# Patient Record
Sex: MALE | URBAN - METROPOLITAN AREA
[De-identification: names, ages, dates, MRNs, and addresses within clinical notes are randomized per-mention and may not be internally consistent; named-entity substitution may affect disease eponyms.]

---

## 2022-01-01 ENCOUNTER — INPATIENT (INPATIENT)
Facility: HOSPITAL | Age: 52
LOS: 0 days | DRG: 23 | End: 2022-08-13
Attending: NEUROLOGICAL SURGERY | Admitting: NEUROLOGICAL SURGERY
Payer: MEDICAID

## 2022-01-01 ENCOUNTER — EMERGENCY (EMERGENCY)
Facility: HOSPITAL | Age: 52
LOS: 0 days | Discharge: ANOTHER HOSPITAL | End: 2022-08-12
Attending: EMERGENCY MEDICINE

## 2022-01-01 ENCOUNTER — APPOINTMENT (OUTPATIENT)
Dept: NEUROSURGERY | Facility: HOSPITAL | Age: 52
End: 2022-01-01

## 2022-01-01 VITALS
WEIGHT: 220.02 LBS | OXYGEN SATURATION: 74 % | HEIGHT: 71 IN | RESPIRATION RATE: 16 BRPM | DIASTOLIC BLOOD PRESSURE: 87 MMHG | HEART RATE: 92 BPM | SYSTOLIC BLOOD PRESSURE: 159 MMHG | TEMPERATURE: 102 F

## 2022-01-01 VITALS
HEART RATE: 89 BPM | OXYGEN SATURATION: 94 % | SYSTOLIC BLOOD PRESSURE: 163 MMHG | RESPIRATION RATE: 24 BRPM | DIASTOLIC BLOOD PRESSURE: 74 MMHG | TEMPERATURE: 98 F

## 2022-01-01 VITALS — HEART RATE: 78 BPM | OXYGEN SATURATION: 90 %

## 2022-01-01 DIAGNOSIS — A41.9 SEPSIS, UNSPECIFIED ORGANISM: ICD-10-CM

## 2022-01-01 DIAGNOSIS — I63.9 CEREBRAL INFARCTION, UNSPECIFIED: ICD-10-CM

## 2022-01-01 DIAGNOSIS — R46.4 SLOWNESS AND POOR RESPONSIVENESS: ICD-10-CM

## 2022-01-01 DIAGNOSIS — R41.82 ALTERED MENTAL STATUS, UNSPECIFIED: ICD-10-CM

## 2022-01-01 DIAGNOSIS — K40.90 UNILATERAL INGUINAL HERNIA, WITHOUT OBSTRUCTION OR GANGRENE, NOT SPECIFIED AS RECURRENT: ICD-10-CM

## 2022-01-01 DIAGNOSIS — Z20.822 CONTACT WITH AND (SUSPECTED) EXPOSURE TO COVID-19: ICD-10-CM

## 2022-01-01 LAB
ALBUMIN SERPL ELPH-MCNC: 3 G/DL — LOW (ref 3.3–5)
ALBUMIN SERPL ELPH-MCNC: 3.4 G/DL — SIGNIFICANT CHANGE UP (ref 3.3–5)
ALP SERPL-CCNC: 64 U/L — SIGNIFICANT CHANGE UP (ref 40–120)
ALP SERPL-CCNC: 88 U/L — SIGNIFICANT CHANGE UP (ref 40–120)
ALT FLD-CCNC: 13 U/L — SIGNIFICANT CHANGE UP (ref 10–45)
ALT FLD-CCNC: 17 U/L — SIGNIFICANT CHANGE UP (ref 12–78)
AMMONIA BLD-MCNC: 54 UMOL/L — HIGH (ref 11–32)
AMPHET UR-MCNC: NEGATIVE — SIGNIFICANT CHANGE UP
AMPHET UR-MCNC: NEGATIVE — SIGNIFICANT CHANGE UP
ANION GAP SERPL CALC-SCNC: 10 MMOL/L — SIGNIFICANT CHANGE UP (ref 5–17)
ANION GAP SERPL CALC-SCNC: 10 MMOL/L — SIGNIFICANT CHANGE UP (ref 5–17)
ANION GAP SERPL CALC-SCNC: 11 MMOL/L — SIGNIFICANT CHANGE UP (ref 5–17)
ANION GAP SERPL CALC-SCNC: 11 MMOL/L — SIGNIFICANT CHANGE UP (ref 5–17)
ANION GAP SERPL CALC-SCNC: 8 MMOL/L — SIGNIFICANT CHANGE UP (ref 5–17)
ANION GAP SERPL CALC-SCNC: 9 MMOL/L — SIGNIFICANT CHANGE UP (ref 5–17)
APPEARANCE UR: CLEAR — SIGNIFICANT CHANGE UP
APPEARANCE UR: CLEAR — SIGNIFICANT CHANGE UP
APTT BLD: 27.1 SEC — LOW (ref 27.5–35.5)
APTT BLD: 32.3 SEC — SIGNIFICANT CHANGE UP (ref 27.5–35.5)
AST SERPL-CCNC: 19 U/L — SIGNIFICANT CHANGE UP (ref 15–37)
AST SERPL-CCNC: 23 U/L — SIGNIFICANT CHANGE UP (ref 10–40)
BARBITURATES UR SCN-MCNC: NEGATIVE — SIGNIFICANT CHANGE UP
BARBITURATES UR SCN-MCNC: NEGATIVE — SIGNIFICANT CHANGE UP
BASE EXCESS BLDA CALC-SCNC: -0.2 MMOL/L — SIGNIFICANT CHANGE UP (ref -2–3)
BASE EXCESS BLDA CALC-SCNC: -0.6 MMOL/L — SIGNIFICANT CHANGE UP (ref -2–3)
BASE EXCESS BLDA CALC-SCNC: -1.1 MMOL/L — SIGNIFICANT CHANGE UP (ref -2–3)
BASE EXCESS BLDA CALC-SCNC: -1.3 MMOL/L — SIGNIFICANT CHANGE UP (ref -2–3)
BASE EXCESS BLDA CALC-SCNC: -1.7 MMOL/L — SIGNIFICANT CHANGE UP (ref -2–3)
BASE EXCESS BLDA CALC-SCNC: -3.1 MMOL/L — LOW (ref -2–3)
BASE EXCESS BLDA CALC-SCNC: -4.1 MMOL/L — LOW (ref -2–3)
BASE EXCESS BLDA CALC-SCNC: -4.3 MMOL/L — LOW (ref -2–3)
BASE EXCESS BLDA CALC-SCNC: -4.6 MMOL/L — LOW (ref -2–3)
BASE EXCESS BLDA CALC-SCNC: -5.4 MMOL/L — LOW (ref -2–3)
BASE EXCESS BLDA CALC-SCNC: SIGNIFICANT CHANGE UP MMOL/L (ref -2–3)
BASOPHILS # BLD AUTO: 0.03 K/UL — SIGNIFICANT CHANGE UP (ref 0–0.2)
BASOPHILS # BLD AUTO: 0.03 K/UL — SIGNIFICANT CHANGE UP (ref 0–0.2)
BASOPHILS # BLD AUTO: 0.08 K/UL — SIGNIFICANT CHANGE UP (ref 0–0.2)
BASOPHILS NFR BLD AUTO: 0.2 % — SIGNIFICANT CHANGE UP (ref 0–2)
BASOPHILS NFR BLD AUTO: 0.2 % — SIGNIFICANT CHANGE UP (ref 0–2)
BASOPHILS NFR BLD AUTO: 0.6 % — SIGNIFICANT CHANGE UP (ref 0–2)
BENZODIAZ UR-MCNC: NEGATIVE — SIGNIFICANT CHANGE UP
BENZODIAZ UR-MCNC: NEGATIVE — SIGNIFICANT CHANGE UP
BILIRUB SERPL-MCNC: 0.3 MG/DL — SIGNIFICANT CHANGE UP (ref 0.2–1.2)
BILIRUB SERPL-MCNC: 0.3 MG/DL — SIGNIFICANT CHANGE UP (ref 0.2–1.2)
BILIRUB UR-MCNC: NEGATIVE — SIGNIFICANT CHANGE UP
BILIRUB UR-MCNC: NEGATIVE — SIGNIFICANT CHANGE UP
BLD GP AB SCN SERPL QL: NEGATIVE — SIGNIFICANT CHANGE UP
BLOOD GAS COMMENTS ARTERIAL: SIGNIFICANT CHANGE UP
BLOOD GAS COMMENTS ARTERIAL: SIGNIFICANT CHANGE UP
BUN SERPL-MCNC: 13 MG/DL — SIGNIFICANT CHANGE UP (ref 7–23)
BUN SERPL-MCNC: 13 MG/DL — SIGNIFICANT CHANGE UP (ref 7–23)
BUN SERPL-MCNC: 14 MG/DL — SIGNIFICANT CHANGE UP (ref 7–23)
BUN SERPL-MCNC: 14 MG/DL — SIGNIFICANT CHANGE UP (ref 7–23)
BUN SERPL-MCNC: 15 MG/DL — SIGNIFICANT CHANGE UP (ref 7–23)
BUN SERPL-MCNC: 23 MG/DL — SIGNIFICANT CHANGE UP (ref 7–23)
CALCIUM SERPL-MCNC: 6.6 MG/DL — LOW (ref 8.4–10.5)
CALCIUM SERPL-MCNC: 8 MG/DL — LOW (ref 8.4–10.5)
CALCIUM SERPL-MCNC: 8.3 MG/DL — LOW (ref 8.4–10.5)
CALCIUM SERPL-MCNC: 8.4 MG/DL — SIGNIFICANT CHANGE UP (ref 8.4–10.5)
CALCIUM SERPL-MCNC: 8.4 MG/DL — SIGNIFICANT CHANGE UP (ref 8.4–10.5)
CALCIUM SERPL-MCNC: 8.6 MG/DL — SIGNIFICANT CHANGE UP (ref 8.5–10.1)
CHLORIDE SERPL-SCNC: 104 MMOL/L — SIGNIFICANT CHANGE UP (ref 96–108)
CHLORIDE SERPL-SCNC: 120 MMOL/L — HIGH (ref 96–108)
CHLORIDE SERPL-SCNC: 121 MMOL/L — HIGH (ref 96–108)
CHLORIDE SERPL-SCNC: 125 MMOL/L — HIGH (ref 96–108)
CHLORIDE SERPL-SCNC: 126 MMOL/L — HIGH (ref 96–108)
CHLORIDE SERPL-SCNC: 126 MMOL/L — HIGH (ref 96–108)
CO2 BLDA-SCNC: 22 MMOL/L — SIGNIFICANT CHANGE UP (ref 19–24)
CO2 BLDA-SCNC: 23 MMOL/L — SIGNIFICANT CHANGE UP (ref 19–24)
CO2 BLDA-SCNC: 23 MMOL/L — SIGNIFICANT CHANGE UP (ref 19–24)
CO2 BLDA-SCNC: 24 MMOL/L — SIGNIFICANT CHANGE UP (ref 19–24)
CO2 BLDA-SCNC: 25 MMOL/L — HIGH (ref 19–24)
CO2 BLDA-SCNC: 25 MMOL/L — HIGH (ref 19–24)
CO2 BLDA-SCNC: 29 MMOL/L — HIGH (ref 19–24)
CO2 BLDA-SCNC: 30 MMOL/L — HIGH (ref 19–24)
CO2 BLDA-SCNC: 30 MMOL/L — HIGH (ref 19–24)
CO2 BLDA-SCNC: 31 MMOL/L — HIGH (ref 19–24)
CO2 BLDA-SCNC: SIGNIFICANT CHANGE UP MMOL/L (ref 19–24)
CO2 SERPL-SCNC: 18 MMOL/L — LOW (ref 22–31)
CO2 SERPL-SCNC: 20 MMOL/L — LOW (ref 22–31)
CO2 SERPL-SCNC: 22 MMOL/L — SIGNIFICANT CHANGE UP (ref 22–31)
CO2 SERPL-SCNC: 31 MMOL/L — SIGNIFICANT CHANGE UP (ref 22–31)
COCAINE METAB.OTHER UR-MCNC: POSITIVE
COCAINE METAB.OTHER UR-MCNC: POSITIVE — SIGNIFICANT CHANGE UP
COLOR SPEC: SIGNIFICANT CHANGE UP
COLOR SPEC: YELLOW — SIGNIFICANT CHANGE UP
CREAT SERPL-MCNC: 0.98 MG/DL — SIGNIFICANT CHANGE UP (ref 0.5–1.3)
CREAT SERPL-MCNC: 1.02 MG/DL — SIGNIFICANT CHANGE UP (ref 0.5–1.3)
CREAT SERPL-MCNC: 1.12 MG/DL — SIGNIFICANT CHANGE UP (ref 0.5–1.3)
CREAT SERPL-MCNC: 1.12 MG/DL — SIGNIFICANT CHANGE UP (ref 0.5–1.3)
CREAT SERPL-MCNC: 1.19 MG/DL — SIGNIFICANT CHANGE UP (ref 0.5–1.3)
CREAT SERPL-MCNC: 1.49 MG/DL — HIGH (ref 0.5–1.3)
CULTURE RESULTS: NO GROWTH — SIGNIFICANT CHANGE UP
DIFF PNL FLD: NEGATIVE — SIGNIFICANT CHANGE UP
DIFF PNL FLD: NEGATIVE — SIGNIFICANT CHANGE UP
EGFR: 56 ML/MIN/1.73M2 — LOW
EGFR: 73 ML/MIN/1.73M2 — SIGNIFICANT CHANGE UP
EGFR: 79 ML/MIN/1.73M2 — SIGNIFICANT CHANGE UP
EGFR: 79 ML/MIN/1.73M2 — SIGNIFICANT CHANGE UP
EGFR: 88 ML/MIN/1.73M2 — SIGNIFICANT CHANGE UP
EGFR: 92 ML/MIN/1.73M2 — SIGNIFICANT CHANGE UP
EOSINOPHIL # BLD AUTO: 0.01 K/UL — SIGNIFICANT CHANGE UP (ref 0–0.5)
EOSINOPHIL # BLD AUTO: 0.03 K/UL — SIGNIFICANT CHANGE UP (ref 0–0.5)
EOSINOPHIL # BLD AUTO: 0.04 K/UL — SIGNIFICANT CHANGE UP (ref 0–0.5)
EOSINOPHIL NFR BLD AUTO: 0.1 % — SIGNIFICANT CHANGE UP (ref 0–6)
EOSINOPHIL NFR BLD AUTO: 0.2 % — SIGNIFICANT CHANGE UP (ref 0–6)
EOSINOPHIL NFR BLD AUTO: 0.3 % — SIGNIFICANT CHANGE UP (ref 0–6)
ETHANOL SERPL-MCNC: <10 MG/DL — SIGNIFICANT CHANGE UP (ref 0–10)
GAS PNL BLDA: SIGNIFICANT CHANGE UP
GLUCOSE BLDC GLUCOMTR-MCNC: 128 MG/DL — HIGH (ref 70–99)
GLUCOSE BLDC GLUCOMTR-MCNC: 66 MG/DL — LOW (ref 70–99)
GLUCOSE SERPL-MCNC: 112 MG/DL — HIGH (ref 70–99)
GLUCOSE SERPL-MCNC: 129 MG/DL — HIGH (ref 70–99)
GLUCOSE SERPL-MCNC: 151 MG/DL — HIGH (ref 70–99)
GLUCOSE SERPL-MCNC: 277 MG/DL — HIGH (ref 70–99)
GLUCOSE SERPL-MCNC: 67 MG/DL — LOW (ref 70–99)
GLUCOSE SERPL-MCNC: 98 MG/DL — SIGNIFICANT CHANGE UP (ref 70–99)
GLUCOSE UR QL: NEGATIVE MG/DL — SIGNIFICANT CHANGE UP
GLUCOSE UR QL: NEGATIVE — SIGNIFICANT CHANGE UP
HCO3 BLDA-SCNC: 21 MMOL/L — SIGNIFICANT CHANGE UP (ref 21–28)
HCO3 BLDA-SCNC: 22 MMOL/L — SIGNIFICANT CHANGE UP (ref 21–28)
HCO3 BLDA-SCNC: 24 MMOL/L — SIGNIFICANT CHANGE UP (ref 21–28)
HCO3 BLDA-SCNC: 24 MMOL/L — SIGNIFICANT CHANGE UP (ref 21–28)
HCO3 BLDA-SCNC: 27 MMOL/L — SIGNIFICANT CHANGE UP (ref 21–28)
HCO3 BLDA-SCNC: 28 MMOL/L — SIGNIFICANT CHANGE UP (ref 21–28)
HCO3 BLDA-SCNC: 28 MMOL/L — SIGNIFICANT CHANGE UP (ref 21–28)
HCO3 BLDA-SCNC: 29 MMOL/L — HIGH (ref 21–28)
HCO3 BLDA-SCNC: SIGNIFICANT CHANGE UP MMOL/L (ref 21–28)
HCT VFR BLD CALC: 37.9 % — LOW (ref 39–50)
HCT VFR BLD CALC: 40.2 % — SIGNIFICANT CHANGE UP (ref 39–50)
HCT VFR BLD CALC: 40.3 % — SIGNIFICANT CHANGE UP (ref 39–50)
HCT VFR BLD CALC: 40.9 % — SIGNIFICANT CHANGE UP (ref 39–50)
HCT VFR BLD CALC: 48.4 % — SIGNIFICANT CHANGE UP (ref 39–50)
HGB BLD-MCNC: 12.5 G/DL — LOW (ref 13–17)
HGB BLD-MCNC: 13 G/DL — SIGNIFICANT CHANGE UP (ref 13–17)
HGB BLD-MCNC: 13.2 G/DL — SIGNIFICANT CHANGE UP (ref 13–17)
HGB BLD-MCNC: 13.6 G/DL — SIGNIFICANT CHANGE UP (ref 13–17)
HGB BLD-MCNC: 15.9 G/DL — SIGNIFICANT CHANGE UP (ref 13–17)
HOROWITZ INDEX BLDA+IHG-RTO: 100 — SIGNIFICANT CHANGE UP
HOROWITZ INDEX BLDA+IHG-RTO: 50 — SIGNIFICANT CHANGE UP
HOROWITZ INDEX BLDA+IHG-RTO: 60 — SIGNIFICANT CHANGE UP
IMM GRANULOCYTES NFR BLD AUTO: 0.5 % — SIGNIFICANT CHANGE UP (ref 0–1.5)
IMM GRANULOCYTES NFR BLD AUTO: 0.6 % — SIGNIFICANT CHANGE UP (ref 0–1.5)
IMM GRANULOCYTES NFR BLD AUTO: 1.3 % — SIGNIFICANT CHANGE UP (ref 0–1.5)
INR BLD: 1.03 RATIO — SIGNIFICANT CHANGE UP (ref 0.88–1.16)
INR BLD: 1.09 RATIO — SIGNIFICANT CHANGE UP (ref 0.88–1.16)
KETONES UR-MCNC: NEGATIVE — SIGNIFICANT CHANGE UP
KETONES UR-MCNC: NEGATIVE — SIGNIFICANT CHANGE UP
LACTATE SERPL-SCNC: 0.8 MMOL/L — SIGNIFICANT CHANGE UP (ref 0.7–2)
LACTATE SERPL-SCNC: 1.1 MMOL/L — SIGNIFICANT CHANGE UP (ref 0.7–2)
LACTATE SERPL-SCNC: 3.1 MMOL/L — HIGH (ref 0.7–2)
LEUKOCYTE ESTERASE UR-ACNC: NEGATIVE — SIGNIFICANT CHANGE UP
LEUKOCYTE ESTERASE UR-ACNC: NEGATIVE — SIGNIFICANT CHANGE UP
LYMPHOCYTES # BLD AUTO: 0.61 K/UL — LOW (ref 1–3.3)
LYMPHOCYTES # BLD AUTO: 0.76 K/UL — LOW (ref 1–3.3)
LYMPHOCYTES # BLD AUTO: 15.3 % — SIGNIFICANT CHANGE UP (ref 13–44)
LYMPHOCYTES # BLD AUTO: 2.19 K/UL — SIGNIFICANT CHANGE UP (ref 1–3.3)
LYMPHOCYTES # BLD AUTO: 4.3 % — LOW (ref 13–44)
LYMPHOCYTES # BLD AUTO: 5.1 % — LOW (ref 13–44)
MAGNESIUM SERPL-MCNC: 1.8 MG/DL — SIGNIFICANT CHANGE UP (ref 1.6–2.6)
MAGNESIUM SERPL-MCNC: 2.4 MG/DL — SIGNIFICANT CHANGE UP (ref 1.6–2.6)
MAGNESIUM SERPL-MCNC: 2.4 MG/DL — SIGNIFICANT CHANGE UP (ref 1.6–2.6)
MAGNESIUM SERPL-MCNC: 2.6 MG/DL — SIGNIFICANT CHANGE UP (ref 1.6–2.6)
MCHC RBC-ENTMCNC: 30.8 PG — SIGNIFICANT CHANGE UP (ref 27–34)
MCHC RBC-ENTMCNC: 31 PG — SIGNIFICANT CHANGE UP (ref 27–34)
MCHC RBC-ENTMCNC: 31.2 PG — SIGNIFICANT CHANGE UP (ref 27–34)
MCHC RBC-ENTMCNC: 31.4 PG — SIGNIFICANT CHANGE UP (ref 27–34)
MCHC RBC-ENTMCNC: 31.5 PG — SIGNIFICANT CHANGE UP (ref 27–34)
MCHC RBC-ENTMCNC: 32.3 GM/DL — SIGNIFICANT CHANGE UP (ref 32–36)
MCHC RBC-ENTMCNC: 32.8 GM/DL — SIGNIFICANT CHANGE UP (ref 32–36)
MCHC RBC-ENTMCNC: 32.9 G/DL — SIGNIFICANT CHANGE UP (ref 32–36)
MCHC RBC-ENTMCNC: 33 GM/DL — SIGNIFICANT CHANGE UP (ref 32–36)
MCHC RBC-ENTMCNC: 33.3 GM/DL — SIGNIFICANT CHANGE UP (ref 32–36)
MCV RBC AUTO: 93.3 FL — SIGNIFICANT CHANGE UP (ref 80–100)
MCV RBC AUTO: 94.7 FL — SIGNIFICANT CHANGE UP (ref 80–100)
MCV RBC AUTO: 94.9 FL — SIGNIFICANT CHANGE UP (ref 80–100)
MCV RBC AUTO: 95.7 FL — SIGNIFICANT CHANGE UP (ref 80–100)
MCV RBC AUTO: 95.7 FL — SIGNIFICANT CHANGE UP (ref 80–100)
METHADONE UR-MCNC: NEGATIVE — SIGNIFICANT CHANGE UP
METHADONE UR-MCNC: NEGATIVE — SIGNIFICANT CHANGE UP
MONOCYTES # BLD AUTO: 0.84 K/UL — SIGNIFICANT CHANGE UP (ref 0–0.9)
MONOCYTES # BLD AUTO: 1.13 K/UL — HIGH (ref 0–0.9)
MONOCYTES # BLD AUTO: 1.28 K/UL — HIGH (ref 0–0.9)
MONOCYTES NFR BLD AUTO: 5.9 % — SIGNIFICANT CHANGE UP (ref 2–14)
MONOCYTES NFR BLD AUTO: 7.6 % — SIGNIFICANT CHANGE UP (ref 2–14)
MONOCYTES NFR BLD AUTO: 8.9 % — SIGNIFICANT CHANGE UP (ref 2–14)
NEUTROPHILS # BLD AUTO: 10.57 K/UL — HIGH (ref 1.8–7.4)
NEUTROPHILS # BLD AUTO: 12.53 K/UL — HIGH (ref 1.8–7.4)
NEUTROPHILS # BLD AUTO: 12.77 K/UL — HIGH (ref 1.8–7.4)
NEUTROPHILS NFR BLD AUTO: 73.6 % — SIGNIFICANT CHANGE UP (ref 43–77)
NEUTROPHILS NFR BLD AUTO: 86.5 % — HIGH (ref 43–77)
NEUTROPHILS NFR BLD AUTO: 88.8 % — HIGH (ref 43–77)
NITRITE UR-MCNC: NEGATIVE — SIGNIFICANT CHANGE UP
NITRITE UR-MCNC: NEGATIVE — SIGNIFICANT CHANGE UP
NRBC # BLD: 0 /100 WBCS — SIGNIFICANT CHANGE UP (ref 0–0)
OPIATES UR-MCNC: NEGATIVE — SIGNIFICANT CHANGE UP
OPIATES UR-MCNC: NEGATIVE — SIGNIFICANT CHANGE UP
OSMOLALITY SERPL: 326 MOSMOL/KG — HIGH (ref 275–300)
OSMOLALITY SERPL: 327 MOSMOL/KG — HIGH (ref 275–300)
OSMOLALITY UR: 351 MOS/KG — SIGNIFICANT CHANGE UP (ref 300–900)
OSMOLALITY UR: 457 MOS/KG — SIGNIFICANT CHANGE UP (ref 300–900)
OXYCODONE UR-MCNC: NEGATIVE — SIGNIFICANT CHANGE UP
PCO2 BLDA: 38 MMHG — SIGNIFICANT CHANGE UP (ref 35–48)
PCO2 BLDA: 39 MMHG — SIGNIFICANT CHANGE UP (ref 35–48)
PCO2 BLDA: 40 MMHG — SIGNIFICANT CHANGE UP (ref 35–48)
PCO2 BLDA: 41 MMHG — SIGNIFICANT CHANGE UP (ref 35–48)
PCO2 BLDA: 42 MMHG — SIGNIFICANT CHANGE UP (ref 35–48)
PCO2 BLDA: 46 MMHG — SIGNIFICANT CHANGE UP (ref 35–48)
PCO2 BLDA: 55 MMHG — HIGH (ref 32–46)
PCO2 BLDA: 63 MMHG — HIGH (ref 35–48)
PCO2 BLDA: 77 MMHG — CRITICAL HIGH (ref 35–48)
PCO2 BLDA: 87 MMHG — CRITICAL HIGH (ref 35–48)
PCO2 BLDA: >112 MMHG — CRITICAL HIGH (ref 32–46)
PCP SPEC-MCNC: SIGNIFICANT CHANGE UP
PCP SPEC-MCNC: SIGNIFICANT CHANGE UP
PCP UR-MCNC: NEGATIVE — SIGNIFICANT CHANGE UP
PCP UR-MCNC: NEGATIVE — SIGNIFICANT CHANGE UP
PH BLDA: 6.94 — CRITICAL LOW (ref 7.35–7.45)
PH BLDA: 7.11 — CRITICAL LOW (ref 7.35–7.45)
PH BLDA: 7.18 — CRITICAL LOW (ref 7.35–7.45)
PH BLDA: 7.26 — LOW (ref 7.35–7.45)
PH BLDA: 7.29 — LOW (ref 7.35–7.45)
PH BLDA: 7.3 — LOW (ref 7.35–7.45)
PH BLDA: 7.31 — LOW (ref 7.35–7.45)
PH BLDA: 7.32 — LOW (ref 7.35–7.45)
PH BLDA: 7.36 — SIGNIFICANT CHANGE UP (ref 7.35–7.45)
PH BLDA: 7.38 — SIGNIFICANT CHANGE UP (ref 7.35–7.45)
PH BLDA: 7.4 — SIGNIFICANT CHANGE UP (ref 7.35–7.45)
PH UR: 6.5 — SIGNIFICANT CHANGE UP (ref 5–8)
PH UR: 6.5 — SIGNIFICANT CHANGE UP (ref 5–8)
PHOSPHATE SERPL-MCNC: 2.3 MG/DL — LOW (ref 2.5–4.5)
PHOSPHATE SERPL-MCNC: 2.5 MG/DL — SIGNIFICANT CHANGE UP (ref 2.5–4.5)
PHOSPHATE SERPL-MCNC: 3.4 MG/DL — SIGNIFICANT CHANGE UP (ref 2.5–4.5)
PLATELET # BLD AUTO: 156 K/UL — SIGNIFICANT CHANGE UP (ref 150–400)
PLATELET # BLD AUTO: 158 K/UL — SIGNIFICANT CHANGE UP (ref 150–400)
PLATELET # BLD AUTO: 158 K/UL — SIGNIFICANT CHANGE UP (ref 150–400)
PLATELET # BLD AUTO: 175 K/UL — SIGNIFICANT CHANGE UP (ref 150–400)
PLATELET # BLD AUTO: 218 K/UL — SIGNIFICANT CHANGE UP (ref 150–400)
PO2 BLDA: 137 MMHG — HIGH (ref 83–108)
PO2 BLDA: 169 MMHG — HIGH (ref 83–108)
PO2 BLDA: 170 MMHG — HIGH (ref 83–108)
PO2 BLDA: 181 MMHG — HIGH (ref 83–108)
PO2 BLDA: 189 MMHG — HIGH (ref 83–108)
PO2 BLDA: 232 MMHG — HIGH (ref 83–108)
PO2 BLDA: 264 MMHG — HIGH (ref 83–108)
PO2 BLDA: 283 MMHG — HIGH (ref 83–108)
PO2 BLDA: 293 MMHG — HIGH (ref 83–108)
PO2 BLDA: 351 MMHG — HIGH (ref 83–108)
PO2 BLDA: 524 MMHG — HIGH (ref 83–108)
POTASSIUM SERPL-MCNC: 3.8 MMOL/L — SIGNIFICANT CHANGE UP (ref 3.5–5.3)
POTASSIUM SERPL-MCNC: 3.8 MMOL/L — SIGNIFICANT CHANGE UP (ref 3.5–5.3)
POTASSIUM SERPL-MCNC: 3.9 MMOL/L — SIGNIFICANT CHANGE UP (ref 3.5–5.3)
POTASSIUM SERPL-MCNC: 3.9 MMOL/L — SIGNIFICANT CHANGE UP (ref 3.5–5.3)
POTASSIUM SERPL-MCNC: 4 MMOL/L — SIGNIFICANT CHANGE UP (ref 3.5–5.3)
POTASSIUM SERPL-MCNC: 4.1 MMOL/L — SIGNIFICANT CHANGE UP (ref 3.5–5.3)
POTASSIUM SERPL-SCNC: 3.8 MMOL/L — SIGNIFICANT CHANGE UP (ref 3.5–5.3)
POTASSIUM SERPL-SCNC: 3.8 MMOL/L — SIGNIFICANT CHANGE UP (ref 3.5–5.3)
POTASSIUM SERPL-SCNC: 3.9 MMOL/L — SIGNIFICANT CHANGE UP (ref 3.5–5.3)
POTASSIUM SERPL-SCNC: 3.9 MMOL/L — SIGNIFICANT CHANGE UP (ref 3.5–5.3)
POTASSIUM SERPL-SCNC: 4 MMOL/L — SIGNIFICANT CHANGE UP (ref 3.5–5.3)
POTASSIUM SERPL-SCNC: 4.1 MMOL/L — SIGNIFICANT CHANGE UP (ref 3.5–5.3)
PROT SERPL-MCNC: 6.4 G/DL — SIGNIFICANT CHANGE UP (ref 6–8.3)
PROT SERPL-MCNC: 8.1 GM/DL — SIGNIFICANT CHANGE UP (ref 6–8.3)
PROT UR-MCNC: NEGATIVE MG/DL — SIGNIFICANT CHANGE UP
PROT UR-MCNC: SIGNIFICANT CHANGE UP
PROTHROM AB SERPL-ACNC: 12.4 SEC — SIGNIFICANT CHANGE UP (ref 10.5–13.4)
PROTHROM AB SERPL-ACNC: 12.6 SEC — SIGNIFICANT CHANGE UP (ref 10.5–13.4)
RAPID RVP RESULT: SIGNIFICANT CHANGE UP
RBC # BLD: 4.06 M/UL — LOW (ref 4.2–5.8)
RBC # BLD: 4.2 M/UL — SIGNIFICANT CHANGE UP (ref 4.2–5.8)
RBC # BLD: 4.21 M/UL — SIGNIFICANT CHANGE UP (ref 4.2–5.8)
RBC # BLD: 4.32 M/UL — SIGNIFICANT CHANGE UP (ref 4.2–5.8)
RBC # BLD: 5.1 M/UL — SIGNIFICANT CHANGE UP (ref 4.2–5.8)
RBC # FLD: 12.6 % — SIGNIFICANT CHANGE UP (ref 10.3–14.5)
RBC # FLD: 12.9 % — SIGNIFICANT CHANGE UP (ref 10.3–14.5)
RBC # FLD: 13 % — SIGNIFICANT CHANGE UP (ref 10.3–14.5)
RBC # FLD: 13.2 % — SIGNIFICANT CHANGE UP (ref 10.3–14.5)
RBC # FLD: 13.2 % — SIGNIFICANT CHANGE UP (ref 10.3–14.5)
RH IG SCN BLD-IMP: NEGATIVE — SIGNIFICANT CHANGE UP
RH IG SCN BLD-IMP: NEGATIVE — SIGNIFICANT CHANGE UP
SAO2 % BLDA: 100 % — HIGH (ref 94–98)
SAO2 % BLDA: 98.2 % — HIGH (ref 94–98)
SAO2 % BLDA: 98.6 % — HIGH (ref 94–98)
SAO2 % BLDA: 98.7 % — HIGH (ref 94–98)
SAO2 % BLDA: 98.8 % — HIGH (ref 94–98)
SAO2 % BLDA: 98.8 % — HIGH (ref 94–98)
SAO2 % BLDA: 98.9 % — HIGH (ref 94–98)
SAO2 % BLDA: 99 % — HIGH (ref 94–98)
SAO2 % BLDA: 99 % — HIGH (ref 94–98)
SARS-COV-2 RNA SPEC QL NAA+PROBE: SIGNIFICANT CHANGE UP
SODIUM SERPL-SCNC: 143 MMOL/L — SIGNIFICANT CHANGE UP (ref 135–145)
SODIUM SERPL-SCNC: 151 MMOL/L — HIGH (ref 135–145)
SODIUM SERPL-SCNC: 153 MMOL/L — HIGH (ref 135–145)
SODIUM SERPL-SCNC: 154 MMOL/L — HIGH (ref 135–145)
SODIUM SERPL-SCNC: 155 MMOL/L — HIGH (ref 135–145)
SODIUM SERPL-SCNC: 156 MMOL/L — HIGH (ref 135–145)
SP GR SPEC: 1.01 — SIGNIFICANT CHANGE UP (ref 1.01–1.02)
SP GR SPEC: 1.04 — HIGH (ref 1.01–1.02)
SP GR UR STRIP: 1.02 — SIGNIFICANT CHANGE UP (ref 1.01–1.02)
SP GR UR STRIP: 1.02 — SIGNIFICANT CHANGE UP (ref 1.01–1.02)
SPECIMEN SOURCE: SIGNIFICANT CHANGE UP
THC UR QL: NEGATIVE — SIGNIFICANT CHANGE UP
THC UR QL: NEGATIVE — SIGNIFICANT CHANGE UP
TROPONIN T, HIGH SENSITIVITY RESULT: 27 NG/L — SIGNIFICANT CHANGE UP (ref 0–51)
UROBILINOGEN FLD QL: NEGATIVE MG/DL — SIGNIFICANT CHANGE UP
UROBILINOGEN FLD QL: NEGATIVE — SIGNIFICANT CHANGE UP
WBC # BLD: 12.8 K/UL — HIGH (ref 3.8–10.5)
WBC # BLD: 13.83 K/UL — HIGH (ref 3.8–10.5)
WBC # BLD: 14.13 K/UL — HIGH (ref 3.8–10.5)
WBC # BLD: 14.35 K/UL — HIGH (ref 3.8–10.5)
WBC # BLD: 14.78 K/UL — HIGH (ref 3.8–10.5)
WBC # FLD AUTO: 12.8 K/UL — HIGH (ref 3.8–10.5)
WBC # FLD AUTO: 13.83 K/UL — HIGH (ref 3.8–10.5)
WBC # FLD AUTO: 14.13 K/UL — HIGH (ref 3.8–10.5)
WBC # FLD AUTO: 14.35 K/UL — HIGH (ref 3.8–10.5)
WBC # FLD AUTO: 14.78 K/UL — HIGH (ref 3.8–10.5)

## 2022-01-01 PROCEDURE — 70496 CT ANGIOGRAPHY HEAD: CPT | Mod: 26,MA

## 2022-01-01 PROCEDURE — 93970 EXTREMITY STUDY: CPT | Mod: 26

## 2022-01-01 PROCEDURE — 70450 CT HEAD/BRAIN W/O DYE: CPT | Mod: 26,77

## 2022-01-01 PROCEDURE — 93010 ELECTROCARDIOGRAM REPORT: CPT

## 2022-01-01 PROCEDURE — 71250 CT THORAX DX C-: CPT | Mod: 26,MA

## 2022-01-01 PROCEDURE — 99291 CRITICAL CARE FIRST HOUR: CPT

## 2022-01-01 PROCEDURE — G0426: CPT | Mod: 95

## 2022-01-01 PROCEDURE — 36223 PLACE CATH CAROTID/INOM ART: CPT | Mod: 50,59

## 2022-01-01 PROCEDURE — 93306 TTE W/DOPPLER COMPLETE: CPT | Mod: 26

## 2022-01-01 PROCEDURE — 70496 CT ANGIOGRAPHY HEAD: CPT | Mod: 26

## 2022-01-01 PROCEDURE — 71045 X-RAY EXAM CHEST 1 VIEW: CPT | Mod: 26

## 2022-01-01 PROCEDURE — 70450 CT HEAD/BRAIN W/O DYE: CPT | Mod: 26

## 2022-01-01 PROCEDURE — 70486 CT MAXILLOFACIAL W/O DYE: CPT | Mod: 26,MA

## 2022-01-01 PROCEDURE — 99053 MED SERV 10PM-8AM 24 HR FAC: CPT

## 2022-01-01 PROCEDURE — 61645 PERQ ART M-THROMBECT &/NFS: CPT | Mod: LT

## 2022-01-01 PROCEDURE — 70450 CT HEAD/BRAIN W/O DYE: CPT | Mod: 26,MA

## 2022-01-01 PROCEDURE — 74176 CT ABD & PELVIS W/O CONTRAST: CPT | Mod: 26,MA

## 2022-01-01 PROCEDURE — 72125 CT NECK SPINE W/O DYE: CPT | Mod: 26,MA

## 2022-01-01 PROCEDURE — 70498 CT ANGIOGRAPHY NECK: CPT | Mod: 26,MA

## 2022-01-01 RX ORDER — ACETAMINOPHEN 500 MG
650 TABLET ORAL ONCE
Refills: 0 | Status: COMPLETED | OUTPATIENT
Start: 2022-01-01 | End: 2022-01-01

## 2022-01-01 RX ORDER — VANCOMYCIN HCL 1 G
1500 VIAL (EA) INTRAVENOUS ONCE
Refills: 0 | Status: COMPLETED | OUTPATIENT
Start: 2022-01-01 | End: 2022-01-01

## 2022-01-01 RX ORDER — CHLORHEXIDINE GLUCONATE 213 G/1000ML
15 SOLUTION TOPICAL EVERY 12 HOURS
Refills: 0 | Status: DISCONTINUED | OUTPATIENT
Start: 2022-01-01 | End: 2022-08-15

## 2022-01-01 RX ORDER — CALCIUM GLUCONATE 100 MG/ML
2 VIAL (ML) INTRAVENOUS ONCE
Refills: 0 | Status: COMPLETED | OUTPATIENT
Start: 2022-01-01 | End: 2022-01-01

## 2022-01-01 RX ORDER — CHLORHEXIDINE GLUCONATE 213 G/1000ML
1 SOLUTION TOPICAL
Refills: 0 | Status: DISCONTINUED | OUTPATIENT
Start: 2022-01-01 | End: 2022-08-17

## 2022-01-01 RX ORDER — MANNITOL
100 POWDER (GRAM) MISCELLANEOUS ONCE
Refills: 0 | Status: COMPLETED | OUTPATIENT
Start: 2022-01-01 | End: 2022-01-01

## 2022-01-01 RX ORDER — VASOPRESSIN 20 [USP'U]/ML
0.04 INJECTION INTRAVENOUS
Qty: 50 | Refills: 0 | Status: DISCONTINUED | OUTPATIENT
Start: 2022-01-01 | End: 2022-08-14

## 2022-01-01 RX ORDER — HYDROCORTISONE 20 MG
100 TABLET ORAL ONCE
Refills: 0 | Status: DISCONTINUED | OUTPATIENT
Start: 2022-01-01 | End: 2022-01-01

## 2022-01-01 RX ORDER — HYDROCORTISONE 20 MG
100 TABLET ORAL ONCE
Refills: 0 | Status: COMPLETED | OUTPATIENT
Start: 2022-01-01 | End: 2022-01-01

## 2022-01-01 RX ORDER — VANCOMYCIN HCL 1 G
1000 VIAL (EA) INTRAVENOUS ONCE
Refills: 0 | Status: COMPLETED | OUTPATIENT
Start: 2022-01-01 | End: 2022-01-01

## 2022-01-01 RX ORDER — NOREPINEPHRINE BITARTRATE/D5W 8 MG/250ML
0.05 PLASTIC BAG, INJECTION (ML) INTRAVENOUS
Qty: 8 | Refills: 0 | Status: DISCONTINUED | OUTPATIENT
Start: 2022-01-01 | End: 2022-08-15

## 2022-01-01 RX ORDER — SODIUM CHLORIDE 9 MG/ML
1000 INJECTION INTRAMUSCULAR; INTRAVENOUS; SUBCUTANEOUS ONCE
Refills: 0 | Status: COMPLETED | OUTPATIENT
Start: 2022-01-01 | End: 2022-01-01

## 2022-01-01 RX ORDER — ETOMIDATE 2 MG/ML
30 INJECTION INTRAVENOUS ONCE
Refills: 0 | Status: COMPLETED | OUTPATIENT
Start: 2022-01-01 | End: 2022-01-01

## 2022-01-01 RX ORDER — SODIUM CHLORIDE 9 MG/ML
1000 INJECTION, SOLUTION INTRAVENOUS
Refills: 0 | Status: DISCONTINUED | OUTPATIENT
Start: 2022-01-01 | End: 2022-08-15

## 2022-01-01 RX ORDER — SODIUM CHLORIDE 9 MG/ML
1000 INJECTION INTRAMUSCULAR; INTRAVENOUS; SUBCUTANEOUS
Refills: 0 | Status: DISCONTINUED | OUTPATIENT
Start: 2022-01-01 | End: 2022-08-14

## 2022-01-01 RX ORDER — CEFEPIME 1 G/1
1000 INJECTION, POWDER, FOR SOLUTION INTRAMUSCULAR; INTRAVENOUS ONCE
Refills: 0 | Status: DISCONTINUED | OUTPATIENT
Start: 2022-01-01 | End: 2022-01-01

## 2022-01-01 RX ORDER — PANTOPRAZOLE SODIUM 20 MG/1
40 TABLET, DELAYED RELEASE ORAL DAILY
Refills: 0 | Status: DISCONTINUED | OUTPATIENT
Start: 2022-01-01 | End: 2022-08-17

## 2022-01-01 RX ORDER — CEFEPIME 1 G/1
INJECTION, POWDER, FOR SOLUTION INTRAMUSCULAR; INTRAVENOUS
Refills: 0 | Status: DISCONTINUED | OUTPATIENT
Start: 2022-01-01 | End: 2022-01-01

## 2022-01-01 RX ORDER — CEFEPIME 1 G/1
1000 INJECTION, POWDER, FOR SOLUTION INTRAMUSCULAR; INTRAVENOUS ONCE
Refills: 0 | Status: COMPLETED | OUTPATIENT
Start: 2022-01-01 | End: 2022-01-01

## 2022-01-01 RX ORDER — CEFEPIME 1 G/1
INJECTION, POWDER, FOR SOLUTION INTRAMUSCULAR; INTRAVENOUS
Refills: 0 | Status: DISCONTINUED | OUTPATIENT
Start: 2022-01-01 | End: 2022-08-14

## 2022-01-01 RX ORDER — ASPIRIN/CALCIUM CARB/MAGNESIUM 324 MG
300 TABLET ORAL ONCE
Refills: 0 | Status: COMPLETED | OUTPATIENT
Start: 2022-01-01 | End: 2022-01-01

## 2022-01-01 RX ORDER — SODIUM CHLORIDE 9 MG/ML
1000 INJECTION, SOLUTION INTRAVENOUS
Refills: 0 | Status: DISCONTINUED | OUTPATIENT
Start: 2022-01-01 | End: 2022-01-01

## 2022-01-01 RX ORDER — PHENYLEPHRINE HYDROCHLORIDE 10 MG/ML
0.1 INJECTION INTRAVENOUS
Qty: 40 | Refills: 0 | Status: DISCONTINUED | OUTPATIENT
Start: 2022-01-01 | End: 2022-08-17

## 2022-01-01 RX ORDER — PHENYLEPHRINE HYDROCHLORIDE 10 MG/ML
0.2 INJECTION INTRAVENOUS
Qty: 40 | Refills: 0 | Status: DISCONTINUED | OUTPATIENT
Start: 2022-01-01 | End: 2022-01-01

## 2022-01-01 RX ORDER — MAGNESIUM SULFATE 500 MG/ML
2 VIAL (ML) INJECTION ONCE
Refills: 0 | Status: COMPLETED | OUTPATIENT
Start: 2022-01-01 | End: 2022-01-01

## 2022-01-01 RX ORDER — PROPOFOL 10 MG/ML
10 INJECTION, EMULSION INTRAVENOUS
Qty: 1000 | Refills: 0 | Status: DISCONTINUED | OUTPATIENT
Start: 2022-01-01 | End: 2022-01-01

## 2022-01-01 RX ORDER — CANGRELOR 50 MG/1
2 INJECTION, POWDER, LYOPHILIZED, FOR SOLUTION INTRAVENOUS
Qty: 50 | Refills: 0 | Status: DISCONTINUED | OUTPATIENT
Start: 2022-01-01 | End: 2022-01-01

## 2022-01-01 RX ORDER — NICARDIPINE HYDROCHLORIDE 30 MG/1
5 CAPSULE, EXTENDED RELEASE ORAL
Qty: 40 | Refills: 0 | Status: DISCONTINUED | OUTPATIENT
Start: 2022-01-01 | End: 2022-01-01

## 2022-01-01 RX ORDER — DEXTROSE 50 % IN WATER 50 %
12.5 SYRINGE (ML) INTRAVENOUS ONCE
Refills: 0 | Status: COMPLETED | OUTPATIENT
Start: 2022-01-01 | End: 2022-01-01

## 2022-01-01 RX ORDER — SODIUM CHLORIDE 9 MG/ML
3100 INJECTION INTRAMUSCULAR; INTRAVENOUS; SUBCUTANEOUS ONCE
Refills: 0 | Status: COMPLETED | OUTPATIENT
Start: 2022-01-01 | End: 2022-01-01

## 2022-01-01 RX ORDER — SODIUM CHLORIDE 9 MG/ML
1000 INJECTION INTRAMUSCULAR; INTRAVENOUS; SUBCUTANEOUS
Refills: 0 | Status: DISCONTINUED | OUTPATIENT
Start: 2022-01-01 | End: 2022-01-01

## 2022-01-01 RX ORDER — VANCOMYCIN HCL 1 G
1000 VIAL (EA) INTRAVENOUS EVERY 12 HOURS
Refills: 0 | Status: DISCONTINUED | OUTPATIENT
Start: 2022-01-01 | End: 2022-08-14

## 2022-01-01 RX ORDER — VASOPRESSIN 20 [USP'U]/ML
0.04 INJECTION INTRAVENOUS
Qty: 50 | Refills: 0 | Status: DISCONTINUED | OUTPATIENT
Start: 2022-01-01 | End: 2022-01-01

## 2022-01-01 RX ORDER — CEFEPIME 1 G/1
1000 INJECTION, POWDER, FOR SOLUTION INTRAMUSCULAR; INTRAVENOUS EVERY 8 HOURS
Refills: 0 | Status: DISCONTINUED | OUTPATIENT
Start: 2022-01-01 | End: 2022-01-01

## 2022-01-01 RX ORDER — CEFEPIME 1 G/1
1000 INJECTION, POWDER, FOR SOLUTION INTRAMUSCULAR; INTRAVENOUS EVERY 8 HOURS
Refills: 0 | Status: DISCONTINUED | OUTPATIENT
Start: 2022-01-01 | End: 2022-08-14

## 2022-01-01 RX ORDER — CHLORHEXIDINE GLUCONATE 213 G/1000ML
15 SOLUTION TOPICAL EVERY 12 HOURS
Refills: 0 | Status: DISCONTINUED | OUTPATIENT
Start: 2022-01-01 | End: 2022-01-01

## 2022-01-01 RX ORDER — ROCURONIUM BROMIDE 10 MG/ML
100 VIAL (ML) INTRAVENOUS ONCE
Refills: 0 | Status: COMPLETED | OUTPATIENT
Start: 2022-01-01 | End: 2022-01-01

## 2022-01-01 RX ORDER — SODIUM CHLORIDE 9 MG/ML
10 INJECTION INTRAMUSCULAR; INTRAVENOUS; SUBCUTANEOUS
Refills: 0 | Status: DISCONTINUED | OUTPATIENT
Start: 2022-01-01 | End: 2022-08-14

## 2022-01-01 RX ORDER — HYDROCORTISONE 20 MG
50 TABLET ORAL EVERY 8 HOURS
Refills: 0 | Status: DISCONTINUED | OUTPATIENT
Start: 2022-01-01 | End: 2022-08-15

## 2022-01-01 RX ORDER — PIPERACILLIN AND TAZOBACTAM 4; .5 G/20ML; G/20ML
3.38 INJECTION, POWDER, LYOPHILIZED, FOR SOLUTION INTRAVENOUS ONCE
Refills: 0 | Status: COMPLETED | OUTPATIENT
Start: 2022-01-01 | End: 2022-01-01

## 2022-01-01 RX ADMIN — SODIUM CHLORIDE 3100 MILLILITER(S): 9 INJECTION INTRAMUSCULAR; INTRAVENOUS; SUBCUTANEOUS at 03:19

## 2022-01-01 RX ADMIN — SODIUM CHLORIDE 2000 MILLILITER(S): 9 INJECTION INTRAMUSCULAR; INTRAVENOUS; SUBCUTANEOUS at 10:10

## 2022-01-01 RX ADMIN — Medication 300 MILLIGRAM(S): at 12:10

## 2022-01-01 RX ADMIN — PIPERACILLIN AND TAZOBACTAM 3.38 GRAM(S): 4; .5 INJECTION, POWDER, LYOPHILIZED, FOR SOLUTION INTRAVENOUS at 04:57

## 2022-01-01 RX ADMIN — Medication 300 MILLIGRAM(S): at 11:52

## 2022-01-01 RX ADMIN — Medication 50 MILLIGRAM(S): at 21:09

## 2022-01-01 RX ADMIN — Medication 50 MILLIGRAM(S): at 13:03

## 2022-01-01 RX ADMIN — Medication 50 MILLIGRAM(S): at 21:06

## 2022-01-01 RX ADMIN — Medication 250 MILLIGRAM(S): at 04:34

## 2022-01-01 RX ADMIN — Medication 50 MILLIGRAM(S): at 05:14

## 2022-01-01 RX ADMIN — CHLORHEXIDINE GLUCONATE 15 MILLILITER(S): 213 SOLUTION TOPICAL at 17:13

## 2022-01-01 RX ADMIN — Medication 27 GRAM(S): at 12:53

## 2022-01-01 RX ADMIN — NICARDIPINE HYDROCHLORIDE 25 MG/HR: 30 CAPSULE, EXTENDED RELEASE ORAL at 05:17

## 2022-01-01 RX ADMIN — CHLORHEXIDINE GLUCONATE 15 MILLILITER(S): 213 SOLUTION TOPICAL at 05:16

## 2022-01-01 RX ADMIN — SODIUM CHLORIDE 50 MILLILITER(S): 9 INJECTION, SOLUTION INTRAVENOUS at 07:57

## 2022-01-01 RX ADMIN — CEFEPIME 100 MILLIGRAM(S): 1 INJECTION, POWDER, FOR SOLUTION INTRAMUSCULAR; INTRAVENOUS at 21:06

## 2022-01-01 RX ADMIN — Medication 650 MILLIGRAM(S): at 03:19

## 2022-01-01 RX ADMIN — Medication 9.38 MICROGRAM(S)/KG/MIN: at 17:14

## 2022-01-01 RX ADMIN — SODIUM CHLORIDE 1000 MILLILITER(S): 9 INJECTION INTRAMUSCULAR; INTRAVENOUS; SUBCUTANEOUS at 18:23

## 2022-01-01 RX ADMIN — Medication 1000 GRAM(S): at 14:05

## 2022-01-01 RX ADMIN — Medication 250 MILLIGRAM(S): at 00:21

## 2022-01-01 RX ADMIN — VASOPRESSIN 2.4 UNIT(S)/MIN: 20 INJECTION INTRAVENOUS at 07:56

## 2022-01-01 RX ADMIN — PANTOPRAZOLE SODIUM 40 MILLIGRAM(S): 20 TABLET, DELAYED RELEASE ORAL at 12:10

## 2022-01-01 RX ADMIN — CHLORHEXIDINE GLUCONATE 1 APPLICATION(S): 213 SOLUTION TOPICAL at 05:16

## 2022-01-01 RX ADMIN — Medication 100 MILLIGRAM(S): at 14:05

## 2022-01-01 RX ADMIN — CEFEPIME 100 MILLIGRAM(S): 1 INJECTION, POWDER, FOR SOLUTION INTRAMUSCULAR; INTRAVENOUS at 13:03

## 2022-01-01 RX ADMIN — PHENYLEPHRINE HYDROCHLORIDE 3.75 MICROGRAM(S)/KG/MIN: 10 INJECTION INTRAVENOUS at 17:15

## 2022-01-01 RX ADMIN — SODIUM CHLORIDE 70 MILLILITER(S): 9 INJECTION INTRAMUSCULAR; INTRAVENOUS; SUBCUTANEOUS at 18:24

## 2022-01-01 RX ADMIN — ETOMIDATE 30 MILLIGRAM(S): 2 INJECTION INTRAVENOUS at 03:06

## 2022-01-01 RX ADMIN — VASOPRESSIN 2.4 UNIT(S)/MIN: 20 INJECTION INTRAVENOUS at 18:30

## 2022-01-01 RX ADMIN — CEFEPIME 100 MILLIGRAM(S): 1 INJECTION, POWDER, FOR SOLUTION INTRAMUSCULAR; INTRAVENOUS at 21:09

## 2022-01-01 RX ADMIN — SODIUM CHLORIDE 1000 MILLILITER(S): 9 INJECTION INTRAMUSCULAR; INTRAVENOUS; SUBCUTANEOUS at 12:40

## 2022-01-01 RX ADMIN — Medication 100 MILLIGRAM(S): at 03:07

## 2022-01-01 RX ADMIN — PROPOFOL 5.99 MICROGRAM(S)/KG/MIN: 10 INJECTION, EMULSION INTRAVENOUS at 04:26

## 2022-01-01 RX ADMIN — CEFEPIME 100 MILLIGRAM(S): 1 INJECTION, POWDER, FOR SOLUTION INTRAMUSCULAR; INTRAVENOUS at 05:15

## 2022-01-01 RX ADMIN — PIPERACILLIN AND TAZOBACTAM 200 GRAM(S): 4; .5 INJECTION, POWDER, LYOPHILIZED, FOR SOLUTION INTRAVENOUS at 03:20

## 2022-01-01 RX ADMIN — Medication 200 GRAM(S): at 17:16

## 2022-01-01 RX ADMIN — Medication 12.5 GRAM(S): at 12:00

## 2022-01-01 RX ADMIN — Medication 250 MILLIGRAM(S): at 11:10

## 2022-01-01 RX ADMIN — PANTOPRAZOLE SODIUM 40 MILLIGRAM(S): 20 TABLET, DELAYED RELEASE ORAL at 11:10

## 2022-01-01 RX ADMIN — CHLORHEXIDINE GLUCONATE 15 MILLILITER(S): 213 SOLUTION TOPICAL at 17:03

## 2022-01-01 RX ADMIN — Medication 650 MILLIGRAM(S): at 05:28

## 2022-01-01 RX ADMIN — Medication 1000 MILLIGRAM(S): at 06:06

## 2022-01-01 RX ADMIN — CEFEPIME 100 MILLIGRAM(S): 1 INJECTION, POWDER, FOR SOLUTION INTRAMUSCULAR; INTRAVENOUS at 12:52

## 2022-08-12 NOTE — ED ADULT NURSE REASSESSMENT NOTE - NS ED NURSE REASSESS COMMENT FT1
Pt being transferred at this time to [facility] ED. Nicardipine drip at 5mg/hr, Propofol at 25mcg/hr, and IV fluids with 100cc remaining and infusing. Pt v/s stable at time of transfer. ET tube 8.0, lip line 21, Tidal volume 450, PEEP 5, FiO2 60%, RR 24. Report given to receiving RN in [facility] ED. Safety measures maintained.

## 2022-08-12 NOTE — PROGRESS NOTE ADULT - SUBJECTIVE AND OBJECTIVE BOX
s/p thrombectomy for basilar occlsuion  however was found down with hypercapnic respiratory failure   neuro exam right pupil 5 mm left 4 mm not reactive, no gag no cough, no corneal , 0/5 all over off sedation  he is hypotensive on NE, phenylephrine , s/p 3 L NS bolus  troponin neg   he has respiratory acidosis, RR increased to 20   mannitol x 1   STAT CT head   Neuro IR informed  he is on cangrelor drip and aspirin     Xiomara Garcia NSCU   30 critical care time

## 2022-08-12 NOTE — ED PROVIDER NOTE - CARE PLAN
1 Principal Discharge DX:	AMS (altered mental status)  Secondary Diagnosis:	Acute sepsis   Principal Discharge DX:	AMS (altered mental status)  Secondary Diagnosis:	Acute sepsis  Secondary Diagnosis:	Cerebellar infarct  Secondary Diagnosis:	Inguinal hernia, left

## 2022-08-12 NOTE — ED PROVIDER NOTE - CLINICAL SUMMARY MEDICAL DECISION MAKING FREE TEXT BOX
53 yo M with unresponsiveness, likely accidental OD, additional sepsis alert called for fever, unknown if trauma  -basic labs, coags, etoh, ammonia, abg, blood cx x2, ua, cx, drug screen, lactate, rvp, CT head/neck/maxillo/chest/ab/pel, zosyn coverage for sepsis, hydration bolus, rectal tylenol supp for fever, nicardipine for pressure controll, propofol sedation; pt. intubated for airway protection in ER successfully on first pass  -called ICU for consult stat

## 2022-08-12 NOTE — ED PROVIDER NOTE - OBJECTIVE STATEMENT
Mukesh Bell, PGY-3- 51 yo M transferred for unresponsiveness, suspected overdose.  Pt. was found with girlfriend who appeared intoxicated.  She admits to pt. using drugs.  No response to Narcan on scene.  Pt. found to be febrile in ER.  Intubated for airway protection.  PT. cannot provide history due to mental status.   	ROS: unobtainable from patient   PMH: unknown; Meds: unknown; SH: likely PSA Mukesh Garcia, PGY-3- 53 yo M transferred for unresponsiveness, arrives intubated with suspected cerebellar infarct found on CT head. Pt send for neuro IR.  Pt. was found with girlfriend who appeared intoxicated.  She admits to pt. using drugs.  No response to Narcan on scene.  Pt. found to be febrile in ER.  Intubated for airway protection.  PT. cannot provide history due to mental status.   	ROS: unobtainable from patient   PMH: unknown; Meds: unknown; SH: likely PSA Mukesh Bell, PGY-3- 51 yo M transferred for unresponsiveness, arrives intubated with suspected cerebellar infarct found on CT head. Pt send for neuro IR.  Pt. was found with girlfriend who appeared intoxicated.  She admits to pt. using drugs.  No response to Narcan on scene.  Pt. found to be febrile in ER.  Intubated for airway protection.  PT. cannot provide history due to mental status. Arrives on Propofol and Nicardipine.   	ROS: unobtainable from patient   PMH: unknown; Meds: unknown; SH: likely PSA Mukesh Bell, PGY-3- 51 yo M transferred for unresponsiveness, arrives intubated with suspected cerebellar infarct found on CT head. Pt send for neuro IR.  Pt. was found with girlfriend who appeared intoxicated.  She admits to pt. using drugs.  No response to Narcan on scene.  Pt. found to be febrile in ER.  Intubated for airway protection.  PT. cannot provide history due to mental status. Arrives on Propofol and Nicardipine.   	ROS: unobtainable from patient   PMH: unknown; Meds: unknown; SH: likely PSA    Attendinyo male presents with unresponsiveness.  presented to [facility] and was intubated on arrival.  found to have possible clot on brain CT and transferred for neurosurgery intervention.  pt arrives intubated and sedated.

## 2022-08-12 NOTE — ED PROVIDER NOTE - PHYSICAL EXAMINATION
Vitals: tachy at 173, initial htn at 200/120, febrile at 101.6  Gen: unresponsive, no spontaneous movement, pupils fixed/dilated   Head: ncat, perrla, eomi b/l, missing front incisor, small dry blood around mouth  Neck: supple, no lymphadenopathy, no midline deviation  Heart: tachy but regular rate, no m/r/g  Lungs: CTA b/l, no rales/ronchi/wheezes  Abd: soft, nontender, non-distended, no rebound or guarding  Ext: no clubbing/cyanosis/edema  Neuro: no spontaneous movement, GCS 3

## 2022-08-12 NOTE — H&P ADULT - NSHPPHYSICALEXAM_GEN_ALL_CORE
intubated, sedation held while transporting to CT / IR, pupils pinpoint, trace flexor posturing bilaterally, no cough gag, trace corneal.

## 2022-08-12 NOTE — PROCEDURE NOTE - NSPOSTPRCRAD_GEN_A_CORE
fem/central line located in the/post procedure radiography not performed/post-procedure radiography performed

## 2022-08-12 NOTE — ED ADULT TRIAGE NOTE - MODE OF ARRIVAL
Final Anesthesia Post-op Assessment    Patient: Patrizia Reddy  Procedure(s) Performed: INCISION AND DRAINAGE, ABDOMINAL WALL ABSCESS  Anesthesia type: General    Vitals Value Taken Time   Temp 36.9 °C (98.4 °F) 7/7/2020  8:26 PM   Pulse 65 7/7/2020  8:26 PM   Resp 16 7/7/2020  8:26 PM   SpO2 98 % 7/7/2020  8:26 PM   /75 7/7/2020  8:26 PM       Last 24 I/O:     Intake/Output Summary (Last 24 hours) at 7/8/2020 0154  Last data filed at 7/7/2020 2000  Gross per 24 hour   Intake 1085 ml   Output 240 ml   Net 845 ml         Patient Location: PACU Phase 1  Post-op Vital Signs:stable  Level of Consciousness: awake and alert  Respiratory Status: spontaneous ventilation  Cardiovascular stable  Hydration: euvolemic  Pain Management: adequately controlled  Handoff: Handoff to receiving nurse was performed and questions were answered  Nausea: None  Airway Patency:patent  Post-op Assessment: no complications and patient tolerated procedure well with no complications       Ambulance EMS

## 2022-08-12 NOTE — PROGRESS NOTE ADULT - SUBJECTIVE AND OBJECTIVE BOX
EVENTS:   s/p thrombectomy of basilar artery TICI 2c  arrived to the NSCU hypotensive, started on phenylephrine and NE give 2 L NS bolus       ICU Vital Signs Last 24 Hrs  T(C): --  T(F): --  HR: 79 (12 Aug 2022 08:44) (78 - 79)  BP: 170/81 (12 Aug 2022 08:44) (170/81 - 170/81)  BP(mean): --  ABP: --  ABP(mean): --  RR: 19 (12 Aug 2022 08:44) (19 - 19)  SpO2: 95% (12 Aug 2022 08:44) (90% - 95%)    O2 Parameters below as of 12 Aug 2022 08:00  Patient On (Oxygen Delivery Method): ventilator                           PHYSICAL EXAM:    General: No Acute Distress     Neurological:     Pulmonary: Clear to Auscultation, No Rales, No Rhonchi, No Wheezes     Cardiovascular: S1, S2, Regular Rate and Rhythm     Gastrointestinal: Soft, Nontender, Nondistended     Extremities: No calf tenderness , no groin hematoma     Incision:       MEDICATIONS:  Antibiotics:      Neurological:   aspirin Suppository 300 milliGRAM(s) Rectal once    Cardiac:   norepinephrine Infusion 0.05 MICROgram(s)/kG/Min IV Continuous <Continuous>  phenylephrine    Infusion 0.1 MICROgram(s)/kG/Min IV Continuous <Continuous>    Pulm:    Heme:   cangrelor Infusion 2 MICROgram(s)/kG/Min IV Continuous <Continuous>    Other:   chlorhexidine 0.12% Liquid 15 milliLiter(s) Oral Mucosa every 12 hours  sodium chloride 0.9% Bolus 1000 milliLiter(s) IV Bolus once  sodium chloride 0.9%. 1000 milliLiter(s) IV Continuous <Continuous>       DEVICES: [] Restraints [] DMITRIY/HMV []LD [] ET tube [] Trach [] Chest Tube [] A-line [] Montgomery [] NGT [] Rectal Tube   Mode: AC/ CMV (Assist Control/ Continuous Mandatory Ventilation)  RR (machine): 24  TV (machine): 450  FiO2: 60  PEEP: 5  ITime: 1  MAP: 11  PIP: 23      A/P:  basilar occlusion s/p thrombectomy TICI 2c   was found down, upon arrival has a PaCo2> 100     Neuro: neuro checks q 1 hr, CT head tomorrow morning  MRI brain  cangrelor  aspirin   Respiratory: intubated, will get chest xray, ABG  CV: NSR, will get troponin, TTE, -140 mmhg, is hypotensive, will start phenylephrine   Endocrine: finger sticks q6hrs, ISS , keep sugar 120-180 mmhg   Heme/Onc: INR <1.5, Plt>100            DVT ppx: SCD, contraindicated to start anticoagulant as she is PBD0   Renal:  ml/hr  ID: afebrile  GI: NPO, protonix, NG, colace   Social/Family: updated at bedside  Discharge planning: ICU    Code Status: [x] Full Code [] DNR [] DNI [] Goals of Care:   Disposition: [x] ICU [] Stroke Unit [] RCU []PCU []Floor [] Discharge Home     Patient at high risk for neurologic deterioration, aneurysm rerupture, seizures, ICP crisis, critical care time, excluding procedures: 45 minutes  Patient condition is critical, she has very poor exam.                         EVENTS:   s/p thrombectomy of basilar artery TICI 2c  arrived to the NSCU hypotensive, started on phenylephrine and NE give 2 L NS bolus       ICU Vital Signs Last 24 Hrs  T(C): --  T(F): --  HR: 79 (12 Aug 2022 08:44) (78 - 79)  BP: 170/81 (12 Aug 2022 08:44) (170/81 - 170/81)  BP(mean): --  ABP: --  ABP(mean): --  RR: 19 (12 Aug 2022 08:44) (19 - 19)  SpO2: 95% (12 Aug 2022 08:44) (90% - 95%)    O2 Parameters below as of 12 Aug 2022 08:00  Patient On (Oxygen Delivery Method): ventilator                           PHYSICAL EXAM:    General: No Acute Distress     Neurological: pupils 3 mm fixed, no corneal, no gag, no cough, 0/5 all over     Pulmonary: Clear to Auscultation, No Rales, No Rhonchi, No Wheezes     Cardiovascular: S1, S2, Regular Rate and Rhythm     Gastrointestinal: Soft, Nontender, Nondistended     Extremities: No calf tenderness , no groin hematoma     Incision:       MEDICATIONS:  Antibiotics:      Neurological:   aspirin Suppository 300 milliGRAM(s) Rectal once    Cardiac:   norepinephrine Infusion 0.05 MICROgram(s)/kG/Min IV Continuous <Continuous>  phenylephrine    Infusion 0.1 MICROgram(s)/kG/Min IV Continuous <Continuous>    Pulm:    Heme:   cangrelor Infusion 2 MICROgram(s)/kG/Min IV Continuous <Continuous>    Other:   chlorhexidine 0.12% Liquid 15 milliLiter(s) Oral Mucosa every 12 hours  sodium chloride 0.9% Bolus 1000 milliLiter(s) IV Bolus once  sodium chloride 0.9%. 1000 milliLiter(s) IV Continuous <Continuous>       DEVICES: [] Restraints [] DMITRIY/HMV []LD [] ET tube [] Trach [] Chest Tube [] A-line [] Montgomery [] NGT [] Rectal Tube   Mode: AC/ CMV (Assist Control/ Continuous Mandatory Ventilation)  RR (machine): 24  TV (machine): 450  FiO2: 60  PEEP: 5  ITime: 1  MAP: 11  PIP: 23      A/P:  basilar occlusion s/p thrombectomy TICI 2c   was found down, upon arrival has a PaCo2> 100     Neuro: neuro checks q 1 hr, CT head tomorrow morning  MRI brain  cangrelor  aspirin   Respiratory: intubated, will get chest xray, ABG  CV: NSR, will get troponin, TTE, -140 mmhg, is hypotensive, will start phenylephrine   Endocrine: finger sticks q6hrs, ISS , keep sugar 120-180 mmhg   Heme/Onc: INR <1.5, Plt>100            DVT ppx: SCD, contraindicated to start anticoagulant as she is PBD0   Renal:  ml/hr  ID: afebrile  GI: NPO, protonix, NG, colace   Social/Family: updated at bedside  Discharge planning: ICU    Code Status: [x] Full Code [] DNR [] DNI [] Goals of Care:   Disposition: [x] ICU [] Stroke Unit [] RCU []PCU []Floor [] Discharge Home     Patient at high risk for neurologic deterioration, aneurysm rerupture, seizures, ICP crisis, critical care time, excluding procedures: 45 minutes  Patient condition is critical, hehas very poor exam.                         EVENTS:   s/p thrombectomy of basilar artery TICI 2c  arrived to the NSCU hypotensive, started on phenylephrine and NE give 2 L NS bolus       ICU Vital Signs Last 24 Hrs  T(C): --  T(F): --  HR: 79 (12 Aug 2022 08:44) (78 - 79)  BP: 170/81 (12 Aug 2022 08:44) (170/81 - 170/81)  BP(mean): --  ABP: --  ABP(mean): --  RR: 19 (12 Aug 2022 08:44) (19 - 19)  SpO2: 95% (12 Aug 2022 08:44) (90% - 95%)    O2 Parameters below as of 12 Aug 2022 08:00  Patient On (Oxygen Delivery Method): ventilator                           PHYSICAL EXAM:    General: No Acute Distress     Neurological: pupils 3 mm fixed, no corneal, no gag, no cough, 0/5 all over     Pulmonary: Clear to Auscultation, No Rales, No Rhonchi, No Wheezes     Cardiovascular: S1, S2, Regular Rate and Rhythm     Gastrointestinal: Soft, Nontender, Nondistended     Extremities: No calf tenderness , no groin hematoma     Incision:       MEDICATIONS:  Antibiotics:      Neurological:   aspirin Suppository 300 milliGRAM(s) Rectal once    Cardiac:   norepinephrine Infusion 0.05 MICROgram(s)/kG/Min IV Continuous <Continuous>  phenylephrine    Infusion 0.1 MICROgram(s)/kG/Min IV Continuous <Continuous>    Pulm:    Heme:   cangrelor Infusion 2 MICROgram(s)/kG/Min IV Continuous <Continuous>    Other:   chlorhexidine 0.12% Liquid 15 milliLiter(s) Oral Mucosa every 12 hours  sodium chloride 0.9% Bolus 1000 milliLiter(s) IV Bolus once  sodium chloride 0.9%. 1000 milliLiter(s) IV Continuous <Continuous>       DEVICES: [] Restraints [] DMITRIY/HMV []LD [] ET tube [] Trach [] Chest Tube [] A-line [] Montgomery [] NGT [] Rectal Tube   Mode: AC/ CMV (Assist Control/ Continuous Mandatory Ventilation)  RR (machine): 24  TV (machine): 450  FiO2: 60  PEEP: 5  ITime: 1  MAP: 11  PIP: 23      A/P:  basilar occlusion s/p thrombectomy TICI 2c   was found down, upon arrival has a PaCo2> 100     NIHSS:31    Neuro: neuro checks q 1 hr, CT head tomorrow morning  MRI brain  cangrelor  aspirin   Respiratory: intubated, will get chest xray, ABG  CV: NSR, will get troponin, TTE, -140 mmhg, is hypotensive, will start phenylephrine   Endocrine: finger sticks q6hrs, ISS , keep sugar 120-180 mmhg   Heme/Onc: INR <1.5, Plt>100            DVT ppx: SCD, contraindicated to start anticoagulant as she is PBD0   Renal:  ml/hr  ID: afebrile  GI: NPO, protonix, NG, colace   Social/Family: updated at bedside  Discharge planning: ICU    Code Status: [x] Full Code [] DNR [] DNI [] Goals of Care:   Disposition: [x] ICU [] Stroke Unit [] RCU []PCU []Floor [] Discharge Home     Patient at high risk for neurologic deterioration, aneurysm rerupture, seizures, ICP crisis, critical care time, excluding procedures: 45 minutes  Patient condition is critical, hehas very poor exam.

## 2022-08-12 NOTE — ED ADULT NURSE NOTE - OBJECTIVE STATEMENT
52 yr old male transfer from [facility], where pt was brought to ED at 230am after girlfriend found pt down at home, presumed OD - taken to [facility] ED, CT: +basilar infarct, intubated for O2: 60's, pH: 6.5, narcan given, cardene gtt started for hypertension 180's, cocaine found in labs/urine, at present R pupil: 2 mm sluggish reactive, L pupil: 3 mm sluggish reactive, at present intubated, FiO2: 60, peep: 5, tidal vol: 450 ml, ETT: 8.0, lipline: 22, gonzalez draining clear yellow urine, neurosurgery resident at bedside immediately upon arrival to ED to transport pt to CT --> then IR, report called to IR, pt escorted to IR with Neursurg resident, RT, EDT, T&S labs on hold - per dr warren, RVP/covid: neg at OSH

## 2022-08-12 NOTE — PROGRESS NOTE ADULT - SUBJECTIVE AND OBJECTIVE BOX
HISTORY OF PRESENT ILLNESS:  51 yo M bibems for unresponsiveness, suspected overdose.  Pt. was found with girlfriend who appeared intoxicated.  She admits to pt. using drugs.  No response to Narcan on scene.  Pt. found to be febrile in ER.  Intubated for airway protection.  PT. cannot provide history due to mental status.  Last Known well - unknown      PAST MEDICAL HISTORY:  OVERDOSE- Cocaine  AMS (altered mental status)  Acute sepsis  Cerebellar infarct  Inguinal hernia, left      ALLERGIES:  No Known Allergies      VITALS/DATA/ORDERS: [x] Reviewed  Vital Signs Last 24 Hrs  T(C): 36.7 (12 Aug 2022 07:00), Max: 38.8 (12 Aug 2022 03:38)  T(F): 98 (12 Aug 2022 07:00), Max: 101.9 (12 Aug 2022 03:38)  HR: 89 (12 Aug 2022 07:00) (66 - 177)  BP: 163/74 (12 Aug 2022 07:00) (139/81 - 208/84)  BP(mean): --  RR: 24 (12 Aug 2022 07:00) (9 - 39)  SpO2: 94% (12 Aug 2022 07:00) (74% - 100%)    Parameters below as of 12 Aug 2022 07:00  Patient On (Oxygen Delivery Method): ventilator                            15.9   14.35 )-----------( 218      ( 12 Aug 2022 03:10 )             48.4     08-12    143  |  104  |  23  ----------------------------<  277<H>  3.9   |  31  |  1.49<H>    Ca    8.6      12 Aug 2022 03:10    TPro  8.1  /  Alb  3.0<L>  /  TBili  0.3  /  DBili  x   /  AST  19  /  ALT  17  /  AlkPhos  88  08-12    PT/INR - ( 12 Aug 2022 03:10 )   PT: 12.4 sec;   INR: 1.03 ratio    PTT - ( 12 Aug 2022 03:10 )  PTT:27.1 sec          EXAMINATION: Assisted by (OSH staff)  NIHSS: 38    1A: Level of consciousness       +3= Postures or unresponsive  1B: Ask month and age       +2= 0 questions right  1C: "Blink eyes" and "Squeeze Hands"       +2= Performs 0 tasks    2: Horizontal EOMs       +2= Forzed gaze palsy: cannot be overcome    3: Visual fields       +3= B/l hemianopia    4: Facial palsy (use grimace if obtunded)       +3= B/l complete paralysis (upper/lower face)    5A: Left arm motor drift (count out loud and use fingers to show count)       +4= No movement    5B: Right arm motor drift       +4= No movement    6A: Left leg motor drift       +4= No movement    6B: Right leg motor drift       +4= No movement    7: Limb ataxia (FNF/heel-shin)       0= No ataxia  8: Sensation       +2= Coma/unresponsive    9: Language/aphasia- describe the scene (on greta); name the items; read the sentences (on greta)       +3= coma/unresponsive    10: Dysarthria- read the words        0= Intubated/unable to test    11: Extinction/inattention       +2= extinction to > 1 modality    Patient is 51 yo male with cocaine overdose , intubated secondary to neurologic condition.  Patient on propofol and cardene for BP control.    Patient with NIH extrapolated  as above per report.  Reviewing CT - L cerebellar infarct                  CTA- no flow prox basilar artery with distal reconstitution.  Imaging suggestive of prox basilar occlusion    Plan  Not candidate Alteplace based on Last known well is  unknown  Discussed Case with Neuro IR at [facility]  Maintain permission HTN  Transfer to [facility] for angio and further management          Plan discussed with SANDRINE

## 2022-08-12 NOTE — ED PROVIDER NOTE - OBJECTIVE STATEMENT
51 yo M bibems for unresponsiveness, suspected overdose.  Pt. was found with girlfriend who appeared intoxicated.  She admits to pt. using drugs.  No response to Narcan on scene.  Pt. found to be febrile in ER.  Intubated for airway protection.  PT. cannot provide history due to mental status.   ROS: unobtainable from patient   PMH: unknown; Meds: unknown; SH: likely PSA

## 2022-08-12 NOTE — CHART NOTE - NSCHARTNOTEFT_GEN_A_CORE
CAPRINI SCORE [CLOT] Score on Admission for     AGE RELATED RISK FACTORS                                                       MOBILITY RELATED FACTORS  [ x] Age 41-60 years                                            (1 Point)                  [ ] Bed rest                                                        (1 Point)  [ ] Age: 61-74 years                                           (2 Points)                 [ ] Plaster cast                                                   (2 Points)  [ ] Age= 75 years                                              (3 Points)                 [ ] Bed bound for more than 72 hours                 (2 Points)    DISEASE RELATED RISK FACTORS                                               GENDER SPECIFIC FACTORS  [ ] Edema in the lower extremities                       (1 Point)                  [ ] Pregnancy                                                     (1 Point)  [ ] Varicose veins                                               (1 Point)                  [ ] Post-partum < 6 weeks                                   (1 Point)             [ ] BMI > 25 Kg/m2                                            (1 Point)                  [ ] Hormonal therapy  or oral contraception          (1 Point)                 [ ] Sepsis (in the previous month)                        (1 Point)                  [ ] History of pregnancy complications                 (1 point)  [ ] Pneumonia or serious lung disease                                               [ ] Unexplained or recurrent                     (1 Point)           (in the previous month)                               (1 Point)  [ ] Abnormal pulmonary function test                     (1 Point)                 SURGERY RELATED RISK FACTORS (include planned surgeries)  [ ] Acute myocardial infarction                              (1 Point)                 [ ]  Section                                             (1 Point)  [ ] Congestive heart failure (in the previous month)  (1 Point)         [ ] Minor surgery                                                  (1 Point)   [ ] Inflammatory bowel disease                             (1 Point)                 [ ] Arthroscopic surgery                                        (2 Points)  [ ] Central venous access                                      (2 Points)                [ ] General surgery lasting more than 45 minutes   (2 Points)       [ x] Stroke (in the previous month)                          (5 Points)               [ ] Elective arthroplasty                                         (5 Points)            [ ] current or past malignancy                              (2 Points)                                                                                                       HEMATOLOGY RELATED FACTORS                                                 TRAUMA RELATED RISK FACTORS  [ ] Prior episodes of VTE                                     (3 Points)                [ ] Fracture of the hip, pelvis, or leg                       (5 Points)  [ ] Positive family history for VTE                         (3 Points)                 [ ] Acute spinal cord injury (in the previous month)  (5 Points)  [ ] Prothrombin 41408 A                                     (3 Points)                 [ ] Paralysis  (less than 1 month)                             (5 Points)  [ ] Factor V Leiden                                             (3 Points)                  [ ] Multiple Trauma within 1 month                        (5 Points)  [ ] Lupus anticoagulants                                     (3 Points)                                                           [ ] Anticardiolipin antibodies                               (3 Points)                                                       [ ] High homocysteine in the blood                      (3 Points)                                             [ ] Other congenital or acquired thrombophilia      (3 Points)                                                [ ] Heparin induced thrombocytopenia                  (3 Points)                                          Total Score [       6   ]    Risk:  Very low 0   Low 1 to 2   Moderate 3 to 4   High =5       VTE Prophylasix Recommednations:  [x ] mechanical pneumatic compression devices                                      [ ] contraindicated: _____________________  [ ] chemo prophylasix                                                                                   [ x] contraindicated _____________________    **** HIGH LIKELIHOOD DVT PRESENT ON ADMISSION  [x ] (please order LE dopplers within 24 hours of admission)

## 2022-08-12 NOTE — ED PROVIDER NOTE - PROGRESS NOTE DETAILS
Mukesh Bell, PGY-3- per neuro, turn off propofol, ct head then straight to IR suite. Requested Propofol be stopped.

## 2022-08-12 NOTE — ED PROVIDER NOTE - CLINICAL SUMMARY MEDICAL DECISION MAKING FREE TEXT BOX
Mukesh Garcia, PGY-3- 54 year old male intubated for AMS, transferred for infarct seen on CT head. Per neurosurgery, requesting STAT CT head then transfer IR suite. Labs and other work up deferred at this time per request. No other signs of trauma on pt. Limited neuro exam given pt was on Propofol. Propofol gtt stopped per nsx request.

## 2022-08-12 NOTE — CHART NOTE - NSCHARTNOTEFT_GEN_A_CORE
intubated, sedation held while transporting to CT / IR, pupils pinpoint, trace flexor posturing bilaterally, no cough gag, trace corneal

## 2022-08-12 NOTE — CHART NOTE - NSCHARTNOTEFT_GEN_A_CORE
Interventional Neuro- Radiology   Procedure Note PA-C    Procedure: Catheter Cerebral Angiogram and stroke intervention with [place] frontier stent   Pre- Procedure Diagnosis: basilar occlusion   Post- Procedure Diagnosis: TICI 2C    : Dr Jarek Salas   Fellow:    Dr Wesley Bowling   Physician Assistant: ANI Vargas-C    Nurse:                  Deborah Vincent RN  Radiologic Tech:    Anesthesiologist:  Sheath:      I/Os: EBL less than 10cc  IV fluids:     cc     Urine output     cc    Contrast Omnipaque 240      cc             Vitals: BP         HR      Spo2     %          Preliminary Report:  Using a 5 [place] long sheath to the right groin under MAC sedation via left vertebral artery,  left internal carotid artery, left external carotid artery, right vertebral artery, right internal carotid artery, right external carotid artery a selective cerebral angiography was performed and demonstrated                       Official note to follow.  Patient tolerated procedure well, hemodynamically stable, no change in neurological status compared to baseline.  Results discussed with neuro ICU team, patient and patient's family. Right groin sheath was removed, manual compression held to hemostasis for 20 minutes, no active bleeding, no hematoma, quick clot and safeguard balloon dressing applied at Interventional Neuro- Radiology   Procedure Note PA-C    Procedure: Catheter Cerebral Angiogram and stroke intervention with [place] frontier stent   Pre- Procedure Diagnosis: basilar occlusion   Post- Procedure Diagnosis: TICI 2C    : Dr Jarek Salas   Fellow:    Dr Wesley Bowling   Physician Assistant: Yun Faith PA-C    Nurse:                  Deborah Vincent RN  Radiologic Tech:  Scar Panda LRT  Anesthesiologist: Dr Danilo Lee   Sheath:               8 [place] short sheath      BMX 96 90 cm sheath    purple select catheter       I/Os: EBL less than 10cc  IV fluids: 750cc  Urine 1600cc  Contrast 240  64cc          Heparin  5,ooo units          [place] stent 2.75mm by 8mm     Cangrelor 2 mics     Vitals: /70       HR 76     Spo2 100%          Preliminary Report:  Using a 8 [place] short sheath to the right groin under general sedation via left vertebral artery, left common carotid artery, right common carotid artery a catheter cerebral angiogram, stroke intervention and [place] frontier stent was performed. Official note to follow.  Patient tolerated procedure well, hemodynamically stable, no change in neurological status compared to baseline. Results discussed with Neuro ICU team. Right groin sheath was removed, CELT device and manual compression held to hemostasis for 20 minutes, by Fellow. No active bleeding, no hematoma, quick clot and safeguard balloon dressing applied at 09:45am. Interventional Neuro- Radiology   Procedure Note PA-C    Procedure: Catheter Cerebral Angiogram and stroke intervention with [place] frontier stent   Pre- Procedure Diagnosis: basilar occlusion   Post- Procedure Diagnosis: TICI 2C    : Dr Jarek Salas   Fellow:    Dr Wesley Bowling   Physician Assistant: Yun Faith PA-C    Nurse:                   Deborah Vincent RN  Radiologic Tech:  Scar Panda LRT  Anesthesiologist: Dr Danilo Lee   Sheath:               8 [place] short sheath      BMX 96 90 cm sheath    purple select catheter       I/Os: EBL less than 10cc  IV fluids: 750cc  Urine 1600cc  Contrast 240  64cc          Heparin  5,ooo units          [place]ru [place] stent 2.75mm by 8mm     Cangrelor 2 mics     Vitals: /70       HR 76     Spo2 100%          Preliminary Report:  Using a 8 [place] short sheath to the right groin under general sedation via left vertebral artery, left common carotid artery, right common carotid artery a catheter cerebral angiogram, stroke intervention and [place] frontier stent was performed. Official note to follow.  Patient tolerated procedure well, hemodynamically stable, no change in neurological status compared to baseline. Results discussed with Neuro ICU team. Right groin sheath was removed, CELT device and manual compression held to hemostasis for 20 minutes, by Fellow. No active bleeding, no hematoma, quick clot and safeguard balloon dressing applied at 09:45am. Interventional Neuro- Radiology   Procedure Note PA-C    Procedure: Catheter Cerebral Angiogram and stroke intervention with [place] frontier stent   Pre- Procedure Diagnosis: basilar occlusion   Post- Procedure Diagnosis: TICI 2C    : Dr Jarek Salas   Fellow:    Dr Wesley Bowling   Physician Assistant: Yun Faith PA-C    Nurse:                   Deborah Vincent RN  Radiologic Tech:  Scar Panda LRT  Anesthesiologist: Dr Danilo Lee   Sheath:                8 [place] short sheath      BMX 96 90 cm sheath    purple select catheter       I/Os: EBL less than 10cc  IV fluids: 750cc  Urine 1600cc  Contrast 240  64cc          Heparin  5,ooo units          [place] stent 2.75mm by 8mm     Cangrelor 2 mics     Vitals: /70       HR 76     Spo2 100%          Preliminary Report:  Using a 8 [place] short sheath to the right groin under general sedation via left vertebral artery, left common carotid artery, right common carotid artery a catheter cerebral angiogram, stroke intervention and [place] frontier stent was performed. Official note to follow.  Patient tolerated procedure well, hemodynamically stable, no change in neurological status compared to baseline. Results discussed with Neuro ICU team. Right groin sheath was removed, CELT device and manual compression held to hemostasis for 20 minutes, by Fellow. No active bleeding, no hematoma, quick clot and safeguard balloon dressing applied at 09:45am.

## 2022-08-12 NOTE — ED ADULT NURSE REASSESSMENT NOTE - NS ED NURSE REASSESS COMMENT FT1
Pt intubated and sedated. Size 7.0 ETube, 23 cm at the lip. Pt tolerating ventilator.   ventilator setting: A/C mode, RR24, , PEEP: 5 and Fio2 100%.

## 2022-08-12 NOTE — CHART NOTE - NSCHARTNOTEFT_GEN_A_CORE
This is 51 yo M [facility] for unresponsiveness, suspected overdose.  Pt. was found with girlfriend who appeared intoxicated.  She admits to pt. using drugs.  No response to Narcan on scene.  Pt. found to be febrile in ER.  Intubated for airway protection.  PT. cannot provide history due to mental status. was consulted due to patient being obtunded and require intubation. did not initially accepted the patient at the time due to no labs and results were sent. After labs and CT were resulted, saw patient's CT head shows An area of low density involving the left inferior cerebellum consistent with an acute infarct in the left posterior inferior   cerebellar arterial distribution. Called E-ICU attending. E-ICU attending (Nic) recommended Tele neurologist to see if patient needs care from a tertiary center and CT angio of the head. Relayed the message to the ER attending (Pippa). ER attending states he would just transfer the patient. Notified E-ICU attending (Nic).

## 2022-08-12 NOTE — H&P ADULT - HISTORY OF PRESENT ILLNESS
53yo male presents with unresponsiveness. Presented to [facility] and was intubated on arrival. Found to have possible clot on brain CT and transferred for neurosurgery intervention. Pt arrived intubated and sedated, was taken directly to IR for neurointervention.

## 2022-08-12 NOTE — H&P ADULT - ASSESSMENT
51yo male presents with unresponsiveness. Presented to [place] and was intubated on arrival. Found to have possible clot on brain CT and transferred for neurosurgery intervention. Pt arrived intubated and sedated, was taken directly to IR for neurointervention.

## 2022-08-12 NOTE — ED ADULT NURSE NOTE - OBJECTIVE STATEMENT
BIBA, As per report ambulance was called by girlfriend who reported that pt was unresponsive. Paramedics as reported possible drug use. Unknown down time. Total of 6mg of Narcan. On arrival to ED, pt unresponsive and tachypneic.Spo2 74% on room air.

## 2022-08-12 NOTE — PATIENT PROFILE ADULT - NSFALLSECTIONLABEL_GEN_A_CORE
Laboratory Medicine and Pathology  Transfusion Medicine - Apheresis Procedure Note     Danisha Dobbins MRN# [id number]   YOB: 2000 Age: 18 Medical Director, Blood Bank Laboratory  Pager [phone]         .

## 2022-08-12 NOTE — ED PROVIDER NOTE - PHYSICAL EXAMINATION
General: intubated, sedated, occasional movement of extremities, not purposeful  Skin: no rash, no pallor  Head: normocephalic, atraumatic  Eyes: clear conjunctiva, EOMI  ENMT: airway patent, no nasal discharge  Cardiovascular: normal rate, normal rhythm, S1/S2  Pulmonary: clear to auscultation bilaterally, no rales, rhonchi, or wheeze  Abdomen: soft, nontender  Musculoskeletal: moving extremities well, no deformity  Psych: normal mood, normal affect General: intubated, sedated, occasional movement of extremities, not purposeful  Skin: no rash, no pallor  Head: normocephalic, atraumatic  Eyes: clear conjunctiva, 1 mm b/l   ENMT: inbtubated, no nasal discharge  Cardiovascular: normal rate, normal rhythm, S1/S2  Pulmonary: clear to auscultation bilaterally, no rales, rhonchi, or wheeze  Abdomen: soft, nondistended  Musculoskeletal: moving extremities well, no deformity  Neuro: sedated, 1 mm b/l pupils, occasional movement extremities, not following commands

## 2022-08-12 NOTE — ED PROVIDER NOTE - PROGRESS NOTE DETAILS
spoke with Dr. Anaya, tele neuro via transfer center, recommends transfer for ?basilar A. occlusion and neuro ICU care, also endorsed to Dr. Barrios ER  will be ER to ER transfer  pt. stable for transfer  current exam demonstrates gag reflux and flexural posturing, no corneal, pupils pinpoint and unreactive, no spontaneous eye movement

## 2022-08-12 NOTE — CHART NOTE - NSCHARTNOTEFT_GEN_A_CORE
Interventional Neuro Radiology  Pre-Procedure Note PA-C    This is a 52 year old male BIBEMS to [place], found unresponsive by girlfriend, Ct scan of the head demonstrated an acute infarct in the left cerebellum. Ct angio of the head          Allergies: Allergy Status Unknown  PMHX:  PSHX:   Social History:   FAMILY HISTORY:  Current Medications: cangrelor Infusion 2 MICROgram(s)/kG/Min IV Continuous       Labs:                   Blood Bank:       Assessment/Plan:   This is a 52 year old male with a basilar occlusion, not a candidate for TPA by outside hospital, transported to Neuro IR for a   Procedure, goals, risks, benefits and alternatives  were discussed with patient and patient's family.  All questions were answered.  Risks include but are not limited to stroke, vessel injury, hemorrhage, and or right  groin hematoma.  Patient demonstrates understanding  of all risks involved with this procedure and wishes to continue.   Appropriate  content was obtained from patient and consent is in the patient's chart. Interventional Neuro Radiology  Pre-Procedure Note PA-C    This is a 52 year old male BIBEMS to [place], found unresponsive by girlfriend, Ct scan of the head demonstrated an acute infarct in the left cerebellum. Ct angio of the head          Allergies: Allergy Status Unknown  PMHX:  PSHX:   Social History:   FAMILY HISTORY:  Current Medications: cangrelor Infusion 2 MICROgram(s)/kG/Min IV Continuous       Labs:                         Assessment/Plan:   This is a 52 year old male with a basilar occlusion, not a candidate for TPA by outside hospital, transported to Neuro IR for a   Procedure, goals, risks, benefits and alternatives  were discussed with patient and patient's family.  All questions were answered.  Risks include but are not limited to stroke, vessel injury, hemorrhage, and or right  groin hematoma. Patient demonstrates understanding of all risks involved with this procedure and wishes to continue. Appropriate consent was obtained from patient and consent is in the patient's chart.

## 2022-08-12 NOTE — AIRWAY PLACEMENT NOTE ADULT - AIRWAY COMMENTS:
Pt came from [facility].  Pt came intubated with a size 8.0 mm ETT and secured at 22 cm at  lip line.

## 2022-08-12 NOTE — CHART NOTE - NSCHARTNOTEFT_GEN_A_CORE
Patient with high pressor requirements since coming out of angio and with poor exam off sedation. Emergent L fem line placed for resuscitation. Pt with escalating doses of pressors during the day eventually developed anisocoric, dilated fixed pupils, too unstable for CTA to assess stent patency. Portable CTH w marked edema w effacement of 4th vent and cisterns. No nsgy intervention offered given exam and imaging. Will continue to medically manage and optimize for poss brain death testing. Organ donation referral to be initiated.

## 2022-08-13 NOTE — SPEECH LANGUAGE PATHOLOGY EVALUATION - SLP DIAGNOSIS
Consult for speech-language evaluation received and appreciated. Chart reviewed and case d/w charge RN Sandra. Pt is currently intubated and not medically appropriate for evaluation at this time. Will continue to follow.

## 2022-08-13 NOTE — PROGRESS NOTE ADULT - SUBJECTIVE AND OBJECTIVE BOX
We could not find family member, he has a girlfriend that says that she is his girlfriend who kicked him out of her place and now he is living with another woman who states that she is  his girlfriend.

## 2022-08-13 NOTE — PROGRESS NOTE ADULT - SUBJECTIVE AND OBJECTIVE BOX
HPI: s/p thrombectomy of basilar artery TICI 2c  arrived to the NSCU hypotensive, started on phenylephrine and NE give 2 L NS bolus     SURGERY:       EVENTS: see chart note for further details, on vasopressin        ICU Vital Signs Last 24 Hrs  T(C): 37.6 (13 Aug 2022 03:00), Max: 37.6 (13 Aug 2022 03:00)  T(F): 99.7 (13 Aug 2022 03:00), Max: 99.7 (13 Aug 2022 03:00)  HR: 101 (13 Aug 2022 06:00) (78 - 118)  BP: 179/98 (13 Aug 2022 02:00) (82/53 - 179/98)  BP(mean): 124 (13 Aug 2022 01:30) (62 - 124)  ABP: 118/64 (13 Aug 2022 06:00) (71/45 - 203/96)  ABP(mean): 84 (13 Aug 2022 06:00) (54 - 218)  RR: 20 (13 Aug 2022 06:00) (12 - 21)  SpO2: 95% (13 Aug 2022 06:00) (90% - 100%)    O2 Parameters below as of 12 Aug 2022 08:00  Patient On (Oxygen Delivery Method): ventilator           08-12 @ 07:01  -  08-13 @ 06:25  --------------------------------------------------------  IN: 3965.5 mL / OUT: 4820 mL / NET: -854.5 mL                             13.2   13.83 )-----------( 156      ( 13 Aug 2022 02:20 )             40.3    08-13    156<H>  |  126<H>  |  13  ----------------------------<  129<H>  3.8   |  20<L>  |  1.02    Ca    8.4      13 Aug 2022 02:20  Phos  3.4     08-13  Mg     2.4     08-13     ABG - ( 13 Aug 2022 01:45 )  pH, Arterial: 7.32  pH, Blood: x     /  pCO2: 42    /  pO2: 170   / HCO3: 22    / Base Excess: -4.3  /  SaO2: 98.7                     PHYSICAL EXAM:    General: No Acute Distress     Neurological: pupils 3 mm fixed, no corneal, no gag, no cough, 0/5 all over     Pulmonary: Clear to Auscultation, No Rales, No Rhonchi, No Wheezes     Cardiovascular: S1, S2, Regular Rate and Rhythm     Gastrointestinal: Soft, Nontender, Nondistended     Extremities: No calf tenderness     Incision:       MEDICATIONS:  Antibiotics:   cefepime   IVPB      cefepime   IVPB 1000 milliGRAM(s) IV Intermittent every 8 hours  vancomycin  IVPB 1000 milliGRAM(s) IV Intermittent every 12 hours     Neurological:     Cardiac:   norepinephrine Infusion 0.05 MICROgram(s)/kG/Min IV Continuous <Continuous>  phenylephrine    Infusion 0.1 MICROgram(s)/kG/Min IV Continuous <Continuous>    Pulm:    Heme:     Other:   chlorhexidine 0.12% Liquid 15 milliLiter(s) Oral Mucosa every 12 hours  chlorhexidine 4% Liquid 1 Application(s) Topical <User Schedule>  dextrose 5% + sodium chloride 0.45%. 1000 milliLiter(s) IV Continuous <Continuous>  hydrocortisone sodium succinate Injectable 50 milliGRAM(s) IV Push every 8 hours  pantoprazole  Injectable 40 milliGRAM(s) IV Push daily  sodium chloride 0.9% lock flush 10 milliLiter(s) IV Push every 1 hour PRN Pre/post blood products, medications, blood draw, and to maintain line patency  vasopressin Infusion 0.04 Unit(s)/Min IV Continuous <Continuous>       DEVICES: [] Restraints [] DMITRIY/HMV []LD [] ET tube [] Trach [] Chest Tube [] A-line [] [place] [] NGT [] Rectal Tube   Mode: AC/ CMV (Assist Control/ Continuous Mandatory Ventilation)  RR (machine): 20  TV (machine): 450  FiO2: 50  PEEP: 5  ITime: 1  MAP: 10  PIP: 19      A/P:  HPI:    Neuro: neuro checks q 4 hr, CTH w marked edema w effacement of 4th vent and cisterns; nonsurgical candidate   brain death examination  cangrelor off  aspirin   GOC  Live on notified  Respiratory: intubated, ABG  CV: NSR, vic, vaso (DI)  Endocrine: finger sticks q6hrs, ISS , keep sugar 120-180 mmhg monitor DI  Heme/Onc: INR <1.5, Plt>100            DVT ppx: SCD  Renal:  ml/hr; BMPq6h  ID: afebrile  GI: NPO, protonix, NG, colace                 HPI: s/p thrombectomy of basilar artery TICI 2c  arrived to the NSCU hypotensive, started on phenylephrine and NE give 2 L NS bolus     SURGERY:       EVENTS: DI on vasopressin      T(C): 36.9 (08-13-22 @ 07:00), Max: 37.6 (08-13-22 @ 03:00)  HR: 102 (08-13-22 @ 09:45) (84 - 118)  BP: 179/98 (08-13-22 @ 02:00) (178/103 - 179/98)  RR: 20 (08-13-22 @ 09:45) (16 - 21)  SpO2: 99% (08-13-22 @ 09:45) (95% - 100%)  08-12-22 @ 07:01  -  08-13-22 @ 07:00  --------------------------------------------------------  IN: 4017.9 mL / OUT: 4920 mL / NET: -902.1 mL    08-13-22 @ 07:01  -  08-13-22 @ 10:32  --------------------------------------------------------  IN: 154.8 mL / OUT: 525 mL / NET: -370.2 mL    cefepime   IVPB      cefepime   IVPB 1000 milliGRAM(s) IV Intermittent every 8 hours  chlorhexidine 0.12% Liquid 15 milliLiter(s) Oral Mucosa every 12 hours  chlorhexidine 4% Liquid 1 Application(s) Topical <User Schedule>  dextrose 5% + sodium chloride 0.45%. 1000 milliLiter(s) IV Continuous <Continuous>  hydrocortisone sodium succinate Injectable 50 milliGRAM(s) IV Push every 8 hours  norepinephrine Infusion 0.05 MICROgram(s)/kG/Min IV Continuous <Continuous>  pantoprazole  Injectable 40 milliGRAM(s) IV Push daily  phenylephrine    Infusion 0.1 MICROgram(s)/kG/Min IV Continuous <Continuous>  sodium chloride 0.9% lock flush 10 milliLiter(s) IV Push every 1 hour PRN  vancomycin  IVPB 1000 milliGRAM(s) IV Intermittent every 12 hours  vasopressin Infusion 0.04 Unit(s)/Min IV Continuous <Continuous>  Mode: AC/ CMV (Assist Control/ Continuous Mandatory Ventilation), RR (machine): 20, TV (machine): 450, FiO2: 50, PEEP: 5, ITime: 1, MAP: 1, PIP: 20             PHYSICAL EXAM:    General: No Acute Distress     Neurological: pupils 3 mm fixed, no corneal, no gag, no cough, 0/5 all over     Pulmonary: Clear to Auscultation, No Rales, No Rhonchi, No Wheezes     Cardiovascular: S1, S2, Regular Rate and Rhythm     Gastrointestinal: Soft, Nontender, Nondistended     Extremities: No calf tenderness     Incision:         LABS:  Na: 156 (08-13 @ 02:20), 155 (08-12 @ 21:28), 154 (08-12 @ 18:34), 151 (08-12 @ 11:03)  K: 3.8 (08-13 @ 02:20), 4.1 (08-12 @ 21:28), 4.0 (08-12 @ 18:34), 3.9 (08-12 @ 11:03)  Cl: 126 (08-13 @ 02:20), 126 (08-12 @ 21:28), 125 (08-12 @ 18:34), 121 (08-12 @ 11:03)  CO2: 20 (08-13 @ 02:20), 18 (08-12 @ 21:28), 20 (08-12 @ 18:34), 20 (08-12 @ 11:03)  BUN: 13 (08-13 @ 02:20), 13 (08-12 @ 21:28), 14 (08-12 @ 18:34), 15 (08-12 @ 11:03)  Cr: 1.02 (08-13 @ 02:20), 1.12 (08-12 @ 21:28), 1.19 (08-12 @ 18:34), 1.12 (08-12 @ 11:03)  Glu: 129(08-13 @ 02:20), 112(08-12 @ 21:28), 98(08-12 @ 18:34), 67(08-12 @ 11:03)    Hgb: 13.2 (08-13 @ 02:20), 13.0 (08-12 @ 21:28), 13.6 (08-12 @ 18:34), 12.5 (08-12 @ 11:03)  Hct: 40.3 (08-13 @ 02:20), 40.2 (08-12 @ 21:28), 40.9 (08-12 @ 18:34), 37.9 (08-12 @ 11:03)  WBC: 13.83 (08-13 @ 02:20), 14.78 (08-12 @ 21:28), 14.13 (08-12 @ 18:34), 12.80 (08-12 @ 11:03)  Plt: 156 (08-13 @ 02:20), 158 (08-12 @ 21:28), 175 (08-12 @ 18:34), 158 (08-12 @ 11:03)    INR: 1.09 08-12-22 @ 12:57  PTT: 32.3 08-12-22 @ 12:57          A/P:    Neuro: neuro checks q 4 hr, CTH w marked edema w effacement of 4th vent and cisterns; nonsurgical candidate   brain death examination, he has no brainstem reflexes , no confounding factors   GOC  Live on notified  Respiratory: intubated, ABG, FiO2 100 % , FiO2 > 300   CV: NSR, vic, vaso (DI)  1 L 1/4 NS bolus   hypotension resolved, on hydrocortisone   Endocrine:  keep sugar 140-180 mmhg monitor DI  Renal: D 5  1/2 ml 50 ml/hr   ID: keep temp > 36 degrees   GI: NPO, protonix, NG, colace   continue cefepine and vancomycin   social: trying to find family,      35 critical care time

## 2022-08-13 NOTE — PROGRESS NOTE ADULT - SUBJECTIVE AND OBJECTIVE BOX
EVENTS:   - s/p thrombectomy of basilar artery TICI 2c  - arrived to the NSCU hypotensive, started on phenylephrine and NE give 2 L NS bolus;   - started on vaso for DI, UOP>250cc, monitoring sodium  - CTH w marked edema w effacement of 4th vent and cisterns    ICU Vital Signs Last 24 Hrs  Reviewed      Labs/Data/Imaging:  - Reviewed            PHYSICAL EXAM:    General: No Acute Distress     Neurological: pupils 3 mm fixed, no corneal, no gag, no cough, 0/5 all over     Pulmonary: Clear to Auscultation, No Rales, No Rhonchi, No Wheezes     Cardiovascular: S1, S2, Regular Rate and Rhythm     Gastrointestinal: Soft, Nontender, Nondistended     Extremities: No calf tenderness , no groin hematoma     Incision:

## 2022-08-13 NOTE — PROGRESS NOTE ADULT - SUBJECTIVE AND OBJECTIVE BOX
Patient seen and examined at bedside.    --Anticoagulation--    T(C): 37.6 (08-13-22 @ 03:00), Max: 37.6 (08-13-22 @ 03:00)  HR: 101 (08-13-22 @ 04:15) (78 - 118)  BP: 179/98 (08-13-22 @ 02:00) (82/53 - 179/98)  RR: 20 (08-13-22 @ 04:15) (12 - 21)  SpO2: 97% (08-13-22 @ 04:15) (90% - 100%)  Wt(kg): --    Exam: Intubated, no brainstem reflexes, flaccid x4

## 2022-08-13 NOTE — PROGRESS NOTE ADULT - SUBJECTIVE AND OBJECTIVE BOX
Brain Death Checklist:   Pt's comma is irreversible and cause is a stroke   There were no CNS depressant drugs used in this pt  The blood pressure was at an acceptable range during test  Pt is absent of conditions or medications which may confound examination  Core temp was above 36*  Pt has absence of motor response to pain in all four limbs  Pupils are nonreactive to bright light,   Absent oculocephalic reflex  No deviation of eyes to cold caloric testing  Absent conreal reflex  No gag reflex or cough  response to tracheobronchial suctioning  Apnea test: respirations absent and paCO2 was greater than 60 ( 63___) and there was a greater than 20mmHg above baseline ( _38_)      Blood Gas Profile - Arterial (08.13.22 @ 15:05)    pH, Arterial: 7.26    pCO2, Arterial: 63 mmHg    pO2, Arterial: 293 mmHg    HCO3, Arterial: 28 mmol/L    Base Excess, Arterial: -0.2 mmol/L    Oxygen Saturation, Arterial: 98.9 %    Total CO2, Arterial: 30 mmol/L       Test was performed at _8/13/2022 15:05_ and finished at 15:13   Blood Gas Profile - Arterial (08.13.22 @ 15:10)    pH, Arterial: 7.18    pCO2, Arterial: 77 mmHg    pO2, Arterial: 351 mmHg    HCO3, Arterial: 29 mmol/L    Base Excess, Arterial: -1.7 mmol/L    Oxygen Saturation, Arterial: 98.6 %    Total CO2, Arterial: 31 mmol/L           Dr Teresita Garcia and Dr Blaise Flores_safia___ was present for the procedure and confirmed Brain death time to be  8__/13__/2022__@15:05  Brain Death Checklist:   Pt's comma is irreversible and cause is a stroke   There were no CNS depressant drugs used in this pt  The blood pressure was at an acceptable range during test  Pt is absent of conditions or medications which may confound examination  Core temp was above 36*  Pt has absence of motor response to pain in all four limbs  Pupils are nonreactive to bright light,   Absent oculocephalic reflex  No deviation of eyes to cold caloric testing  Absent conreal reflex  No gag reflex or cough  response to tracheobronchial suctioning  Apnea test: respirations absent and paCO2 was greater than 60 ( 63___) and there was a greater than 20mmHg above baseline ( _38_)      Blood Gas Profile - Arterial (08.13.22 @ 15:05)    pH, Arterial: 7.26    pCO2, Arterial: 63 mmHg    pO2, Arterial: 293 mmHg    HCO3, Arterial: 28 mmol/L    Base Excess, Arterial: -0.2 mmol/L    Oxygen Saturation, Arterial: 98.9 %    Total CO2, Arterial: 30 mmol/L       Test was performed at _8/13/2022 15:05_ and finished at 15:13   Blood Gas Profile - Arterial (08.13.22 @ 15:10)    pH, Arterial: 7.18    pCO2, Arterial: 77 mmHg    pO2, Arterial: 351 mmHg    HCO3, Arterial: 29 mmol/L    Base Excess, Arterial: -1.7 mmol/L    Oxygen Saturation, Arterial: 98.6 %    Total CO2, Arterial: 31 mmol/L           Dr Teresita Garcia and Dr Blaise Flores_safia___ was present for the procedure and confirmed Brain death time to be  8__/13__/2022__@15:05   live on informed   ME called

## 2022-08-13 NOTE — SPEECH LANGUAGE PATHOLOGY EVALUATION - SLP PERTINENT HISTORY OF CURRENT PROBLEM
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 Pt not a candidate for TPA by outside hospital. Pt arrived to the NSCU hypotensive. On pressors. S/P thrombectomy of basilar artery TICI 2c.

## 2022-08-14 LAB
ANION GAP SERPL CALC-SCNC: 10 MMOL/L — SIGNIFICANT CHANGE UP (ref 5–17)
BUN SERPL-MCNC: 19 MG/DL — SIGNIFICANT CHANGE UP (ref 7–23)
CALCIUM SERPL-MCNC: 8.4 MG/DL — SIGNIFICANT CHANGE UP (ref 8.4–10.5)
CHLORIDE SERPL-SCNC: 122 MMOL/L — HIGH (ref 96–108)
CO2 SERPL-SCNC: 22 MMOL/L — SIGNIFICANT CHANGE UP (ref 22–31)
CREAT SERPL-MCNC: 0.88 MG/DL — SIGNIFICANT CHANGE UP (ref 0.5–1.3)
EGFR: 103 ML/MIN/1.73M2 — SIGNIFICANT CHANGE UP
GAS PNL BLDA: SIGNIFICANT CHANGE UP
GLUCOSE BLDC GLUCOMTR-MCNC: 107 MG/DL — HIGH (ref 70–99)
GLUCOSE SERPL-MCNC: 106 MG/DL — HIGH (ref 70–99)
HCT VFR BLD CALC: 33.1 % — LOW (ref 39–50)
HGB BLD-MCNC: 10.6 G/DL — LOW (ref 13–17)
MAGNESIUM SERPL-MCNC: 2.4 MG/DL — SIGNIFICANT CHANGE UP (ref 1.6–2.6)
MCHC RBC-ENTMCNC: 30.9 PG — SIGNIFICANT CHANGE UP (ref 27–34)
MCHC RBC-ENTMCNC: 32 GM/DL — SIGNIFICANT CHANGE UP (ref 32–36)
MCV RBC AUTO: 96.5 FL — SIGNIFICANT CHANGE UP (ref 80–100)
NRBC # BLD: 0 /100 WBCS — SIGNIFICANT CHANGE UP (ref 0–0)
PHOSPHATE SERPL-MCNC: 1.8 MG/DL — LOW (ref 2.5–4.5)
PLATELET # BLD AUTO: 117 K/UL — LOW (ref 150–400)
POTASSIUM SERPL-MCNC: 3.4 MMOL/L — LOW (ref 3.5–5.3)
POTASSIUM SERPL-SCNC: 3.4 MMOL/L — LOW (ref 3.5–5.3)
RBC # BLD: 3.43 M/UL — LOW (ref 4.2–5.8)
RBC # FLD: 13.7 % — SIGNIFICANT CHANGE UP (ref 10.3–14.5)
SODIUM SERPL-SCNC: 154 MMOL/L — HIGH (ref 135–145)
WBC # BLD: 14.34 K/UL — HIGH (ref 3.8–10.5)
WBC # FLD AUTO: 14.34 K/UL — HIGH (ref 3.8–10.5)

## 2022-08-14 PROCEDURE — 99291 CRITICAL CARE FIRST HOUR: CPT

## 2022-08-14 PROCEDURE — 71045 X-RAY EXAM CHEST 1 VIEW: CPT | Mod: 26

## 2022-08-14 RX ORDER — LEVOTHYROXINE SODIUM 125 MCG
20 TABLET ORAL ONCE
Refills: 0 | Status: COMPLETED | OUTPATIENT
Start: 2022-08-14 | End: 2022-08-14

## 2022-08-14 RX ORDER — SODIUM,POTASSIUM PHOSPHATES 278-250MG
1 POWDER IN PACKET (EA) ORAL ONCE
Refills: 0 | Status: COMPLETED | OUTPATIENT
Start: 2022-08-14 | End: 2022-08-14

## 2022-08-14 RX ORDER — POTASSIUM CHLORIDE 20 MEQ
20 PACKET (EA) ORAL ONCE
Refills: 0 | Status: COMPLETED | OUTPATIENT
Start: 2022-08-14 | End: 2022-08-14

## 2022-08-14 RX ORDER — LEVOTHYROXINE SODIUM 125 MCG
10 TABLET ORAL
Qty: 200 | Refills: 0 | Status: DISCONTINUED | OUTPATIENT
Start: 2022-08-14 | End: 2022-08-17

## 2022-08-14 RX ORDER — POTASSIUM PHOSPHATE, MONOBASIC POTASSIUM PHOSPHATE, DIBASIC 236; 224 MG/ML; MG/ML
30 INJECTION, SOLUTION INTRAVENOUS ONCE
Refills: 0 | Status: COMPLETED | OUTPATIENT
Start: 2022-08-14 | End: 2022-08-14

## 2022-08-14 RX ORDER — VASOPRESSIN 20 [USP'U]/ML
0.02 INJECTION INTRAVENOUS
Qty: 50 | Refills: 0 | Status: DISCONTINUED | OUTPATIENT
Start: 2022-08-14 | End: 2022-08-17

## 2022-08-14 RX ORDER — POTASSIUM PHOSPHATE, MONOBASIC POTASSIUM PHOSPHATE, DIBASIC 236; 224 MG/ML; MG/ML
15 INJECTION, SOLUTION INTRAVENOUS ONCE
Refills: 0 | Status: COMPLETED | OUTPATIENT
Start: 2022-08-14 | End: 2022-08-14

## 2022-08-14 RX ADMIN — Medication 50 MILLIGRAM(S): at 22:41

## 2022-08-14 RX ADMIN — PHENYLEPHRINE HYDROCHLORIDE 3.75 MICROGRAM(S)/KG/MIN: 10 INJECTION INTRAVENOUS at 11:56

## 2022-08-14 RX ADMIN — Medication 20 MICROGRAM(S): at 16:32

## 2022-08-14 RX ADMIN — VASOPRESSIN 2.4 UNIT(S)/MIN: 20 INJECTION INTRAVENOUS at 07:16

## 2022-08-14 RX ADMIN — Medication 1 PACKET(S): at 17:16

## 2022-08-14 RX ADMIN — CEFEPIME 100 MILLIGRAM(S): 1 INJECTION, POWDER, FOR SOLUTION INTRAMUSCULAR; INTRAVENOUS at 05:40

## 2022-08-14 RX ADMIN — VASOPRESSIN 2.4 UNIT(S)/MIN: 20 INJECTION INTRAVENOUS at 17:16

## 2022-08-14 RX ADMIN — Medication 250 MILLIGRAM(S): at 01:11

## 2022-08-14 RX ADMIN — Medication 50 MILLIGRAM(S): at 05:40

## 2022-08-14 RX ADMIN — Medication 50 MILLIGRAM(S): at 14:18

## 2022-08-14 RX ADMIN — SODIUM CHLORIDE 50 MILLILITER(S): 9 INJECTION, SOLUTION INTRAVENOUS at 07:16

## 2022-08-14 RX ADMIN — CHLORHEXIDINE GLUCONATE 15 MILLILITER(S): 213 SOLUTION TOPICAL at 17:56

## 2022-08-14 RX ADMIN — Medication 100 MILLIEQUIVALENT(S): at 16:50

## 2022-08-14 RX ADMIN — Medication 25 MICROGRAM(S)/HR: at 16:32

## 2022-08-14 RX ADMIN — CHLORHEXIDINE GLUCONATE 15 MILLILITER(S): 213 SOLUTION TOPICAL at 05:44

## 2022-08-14 RX ADMIN — PANTOPRAZOLE SODIUM 40 MILLIGRAM(S): 20 TABLET, DELAYED RELEASE ORAL at 11:55

## 2022-08-14 RX ADMIN — POTASSIUM PHOSPHATE, MONOBASIC POTASSIUM PHOSPHATE, DIBASIC 62.5 MILLIMOLE(S): 236; 224 INJECTION, SOLUTION INTRAVENOUS at 05:41

## 2022-08-14 RX ADMIN — CHLORHEXIDINE GLUCONATE 1 APPLICATION(S): 213 SOLUTION TOPICAL at 05:44

## 2022-08-14 RX ADMIN — POTASSIUM PHOSPHATE, MONOBASIC POTASSIUM PHOSPHATE, DIBASIC 125 MILLIMOLE(S): 236; 224 INJECTION, SOLUTION INTRAVENOUS at 17:58

## 2022-08-14 NOTE — CHART NOTE - NSCHARTNOTEFT_GEN_A_CORE
we have attempted to find Rosalia family, we have talked to 2 of his girlfriends. They said that he has a daughter, we have tried to find a daughter but we could not. We found his father, Haim, lives in [place], [phone], I told him that unfortunately even though we tried the most aggressive medical management, ROSALIA did not survive his stroke and was declared brain dead yesterday.   I told him that live on will talk to him   Xiomara Garcia [facility] attending we have attempted to find Anibal family, we have talked to 2 of his girlfriends. They said that he has a daughter, we have tried to find a daughter but we could not. We found his father, Haim, lives in [place], [phone], I told him that unfortunately even though we tried the most aggressive medical management, ANIBAL did not survive his stroke and was declared brain dead yesterday.   I told him that he is still attached to the ventilator pending live on discussion with the family   [facility]

## 2022-08-14 NOTE — PROGRESS NOTE ADULT - SUBJECTIVE AND OBJECTIVE BOX
O:  brain dead     T(C): 36.5 (08-14-22 @ 07:00), Max: 37.1 (08-13-22 @ 16:00)  HR: 86 (08-14-22 @ 11:00) (75 - 112)  BP: --  RR: 20 (08-14-22 @ 11:00) (0 - 20)  SpO2: 100% (08-14-22 @ 11:00) (97% - 100%)  08-13-22 @ 07:01  -  08-14-22 @ 07:00  --------------------------------------------------------  IN: 2612.9 mL / OUT: 1625 mL / NET: 987.9 mL    08-14-22 @ 07:01  -  08-14-22 @ 11:36  --------------------------------------------------------  IN: 334.6 mL / OUT: 100 mL / NET: 234.6 mL    chlorhexidine 0.12% Liquid 15 milliLiter(s) Oral Mucosa every 12 hours  chlorhexidine 4% Liquid 1 Application(s) Topical <User Schedule>  dextrose 5% + sodium chloride 0.45%. 1000 milliLiter(s) IV Continuous <Continuous>  hydrocortisone sodium succinate Injectable 50 milliGRAM(s) IV Push every 8 hours  norepinephrine Infusion 0.05 MICROgram(s)/kG/Min IV Continuous <Continuous>  pantoprazole  Injectable 40 milliGRAM(s) IV Push daily  phenylephrine    Infusion 0.1 MICROgram(s)/kG/Min IV Continuous <Continuous>  Mode: AC/ CMV (Assist Control/ Continuous Mandatory Ventilation), RR (machine): 20, TV (machine): 450, FiO2: 100, PEEP: 5, ITime: 1, MAP: 10, PIP: 20  PHYSICAL EXAM:    General: No Acute Distress     Neurological: pupils 3 mm fixed, no corneal, no gag, no cough, 0/5 all over     Pulmonary: Clear to Auscultation, No Rales, No Rhonchi, No Wheezes     Cardiovascular: S1, S2, Regular Rate and Rhythm     Gastrointestinal: Soft, Nontender, Nondistended     Extremities: No calf tenderness     Incision:         LABS:  Na: 153 (08-13 @ 13:21), 156 (08-13 @ 02:20), 155 (08-12 @ 21:28), 154 (08-12 @ 18:34), 151 (08-12 @ 11:03)  K: 3.8 (08-13 @ 13:21), 3.8 (08-13 @ 02:20), 4.1 (08-12 @ 21:28), 4.0 (08-12 @ 18:34), 3.9 (08-12 @ 11:03)  Cl: 120 (08-13 @ 13:21), 126 (08-13 @ 02:20), 126 (08-12 @ 21:28), 125 (08-12 @ 18:34), 121 (08-12 @ 11:03)  CO2: 22 (08-13 @ 13:21), 20 (08-13 @ 02:20), 18 (08-12 @ 21:28), 20 (08-12 @ 18:34), 20 (08-12 @ 11:03)  BUN: 14 (08-13 @ 13:21), 13 (08-13 @ 02:20), 13 (08-12 @ 21:28), 14 (08-12 @ 18:34), 15 (08-12 @ 11:03)  Cr: 0.98 (08-13 @ 13:21), 1.02 (08-13 @ 02:20), 1.12 (08-12 @ 21:28), 1.19 (08-12 @ 18:34), 1.12 (08-12 @ 11:03)  Glu: 151(08-13 @ 13:21), 129(08-13 @ 02:20), 112(08-12 @ 21:28), 98(08-12 @ 18:34), 67(08-12 @ 11:03)    Hgb: 13.2 (08-13 @ 02:20), 13.0 (08-12 @ 21:28), 13.6 (08-12 @ 18:34), 12.5 (08-12 @ 11:03)  Hct: 40.3 (08-13 @ 02:20), 40.2 (08-12 @ 21:28), 40.9 (08-12 @ 18:34), 37.9 (08-12 @ 11:03)  WBC: 13.83 (08-13 @ 02:20), 14.78 (08-12 @ 21:28), 14.13 (08-12 @ 18:34), 12.80 (08-12 @ 11:03)  Plt: 156 (08-13 @ 02:20), 158 (08-12 @ 21:28), 175 (08-12 @ 18:34), 158 (08-12 @ 11:03)    INR: 1.09 08-12-22 @ 12:57  PTT: 32.3 08-12-22 @ 12:57                A/P:  brain dead from stroke  Live on attempting to contact daughter to see if family would like to go for organ donation  he has 2 girlfriends both have been upadetaed   Respiratory: intubated, Mode: AC/ CMV (Assist Control/ Continuous Mandatory Ventilation), RR (machine): 20, TV (machine): 450, FiO2: 100, PEEP: 5, ITime: 1, MAP: 10, PIP: 20  CV: NSR,   hypotensive  d/c vasopressin as he has decreased UO and is not in DI, change to phenyleprhine   monitor for DI   continue  hydrocortisone   Endocrine:  keep sugar 140-180 mmhg   Renal: D 5  1  ID: keep temp > 36 degrees   GI: NPO, protonix, NG, colace   d/c antibiotics cx negative so far no signs of infection   social: trying to find family,      30 critical care time           O:  brain dead     T(C): 36.5 (08-14-22 @ 07:00), Max: 37.1 (08-13-22 @ 16:00)  HR: 86 (08-14-22 @ 11:00) (75 - 112)  BP: --  RR: 20 (08-14-22 @ 11:00) (0 - 20)  SpO2: 100% (08-14-22 @ 11:00) (97% - 100%)  08-13-22 @ 07:01  -  08-14-22 @ 07:00  --------------------------------------------------------  IN: 2612.9 mL / OUT: 1625 mL / NET: 987.9 mL    08-14-22 @ 07:01  -  08-14-22 @ 11:36  --------------------------------------------------------  IN: 334.6 mL / OUT: 100 mL / NET: 234.6 mL    chlorhexidine 0.12% Liquid 15 milliLiter(s) Oral Mucosa every 12 hours  chlorhexidine 4% Liquid 1 Application(s) Topical <User Schedule>  dextrose 5% + sodium chloride 0.45%. 1000 milliLiter(s) IV Continuous <Continuous>  hydrocortisone sodium succinate Injectable 50 milliGRAM(s) IV Push every 8 hours  norepinephrine Infusion 0.05 MICROgram(s)/kG/Min IV Continuous <Continuous>  pantoprazole  Injectable 40 milliGRAM(s) IV Push daily  phenylephrine    Infusion 0.1 MICROgram(s)/kG/Min IV Continuous <Continuous>  Mode: AC/ CMV (Assist Control/ Continuous Mandatory Ventilation), RR (machine): 20, TV (machine): 450, FiO2: 100, PEEP: 5, ITime: 1, MAP: 10, PIP: 20  PHYSICAL EXAM:    General: No Acute Distress     Neurological: pupils 3 mm fixed, no corneal, no gag, no cough, 0/5 all over     Pulmonary: Clear to Auscultation, No Rales, No Rhonchi, No Wheezes     Cardiovascular: S1, S2, Regular Rate and Rhythm     Gastrointestinal: Soft, Nontender, Nondistended     Extremities: No calf tenderness     Incision:         LABS:  Na: 153 (08-13 @ 13:21), 156 (08-13 @ 02:20), 155 (08-12 @ 21:28), 154 (08-12 @ 18:34), 151 (08-12 @ 11:03)  K: 3.8 (08-13 @ 13:21), 3.8 (08-13 @ 02:20), 4.1 (08-12 @ 21:28), 4.0 (08-12 @ 18:34), 3.9 (08-12 @ 11:03)  Cl: 120 (08-13 @ 13:21), 126 (08-13 @ 02:20), 126 (08-12 @ 21:28), 125 (08-12 @ 18:34), 121 (08-12 @ 11:03)  CO2: 22 (08-13 @ 13:21), 20 (08-13 @ 02:20), 18 (08-12 @ 21:28), 20 (08-12 @ 18:34), 20 (08-12 @ 11:03)  BUN: 14 (08-13 @ 13:21), 13 (08-13 @ 02:20), 13 (08-12 @ 21:28), 14 (08-12 @ 18:34), 15 (08-12 @ 11:03)  Cr: 0.98 (08-13 @ 13:21), 1.02 (08-13 @ 02:20), 1.12 (08-12 @ 21:28), 1.19 (08-12 @ 18:34), 1.12 (08-12 @ 11:03)  Glu: 151(08-13 @ 13:21), 129(08-13 @ 02:20), 112(08-12 @ 21:28), 98(08-12 @ 18:34), 67(08-12 @ 11:03)    Hgb: 13.2 (08-13 @ 02:20), 13.0 (08-12 @ 21:28), 13.6 (08-12 @ 18:34), 12.5 (08-12 @ 11:03)  Hct: 40.3 (08-13 @ 02:20), 40.2 (08-12 @ 21:28), 40.9 (08-12 @ 18:34), 37.9 (08-12 @ 11:03)  WBC: 13.83 (08-13 @ 02:20), 14.78 (08-12 @ 21:28), 14.13 (08-12 @ 18:34), 12.80 (08-12 @ 11:03)  Plt: 156 (08-13 @ 02:20), 158 (08-12 @ 21:28), 175 (08-12 @ 18:34), 158 (08-12 @ 11:03)    INR: 1.09 08-12-22 @ 12:57  PTT: 32.3 08-12-22 @ 12:57                A/P:  brain dead from stroke  Live on attempting to contact daughter to see if family would like to go for organ donation  he has 2 girlfriends both have been upadetaed   Respiratory: intubated, Mode: AC/ CMV (Assist Control/ Continuous Mandatory Ventilation), RR (machine): 20, TV (machine): 450, FiO2: 100, PEEP: 5, ITime: 1, MAP: 10, PIP: 20  CV: NSR,   hypotensive  d/c vasopressin as he has decreased UO and is not in DI, change to phenyleprhine   monitor for DI   continue  hydrocortisone   Endocrine:  keep sugar 140-180 mmhg   Renal: D 5  1/2 NS 50 ml/hr   ID: keep temp > 36 degrees   GI: NPO, protonix, NG, colace   d/c antibiotics cx negative so far no signs of infection   social: trying to find family,      30 critical care time

## 2022-08-14 NOTE — PROGRESS NOTE ADULT - SUBJECTIVE AND OBJECTIVE BOX
Patient seen and examined at bedside.    --Anticoagulation--    T(C): 37.1 (08-13-22 @ 16:00), Max: 37.1 (08-13-22 @ 16:00)  HR: 78 (08-14-22 @ 03:02) (78 - 112)  BP: --  RR: 20 (08-14-22 @ 01:00) (0 - 21)  SpO2: 100% (08-14-22 @ 03:02) (95% - 100%)  Wt(kg): --    Exam:  Intubated , no brain stem reflex , flaccid

## 2022-08-15 DIAGNOSIS — I63.9 CEREBRAL INFARCTION, UNSPECIFIED: ICD-10-CM

## 2022-08-15 LAB
A1C WITH ESTIMATED AVERAGE GLUCOSE RESULT: 5.2 % — SIGNIFICANT CHANGE UP (ref 4–5.6)
ALBUMIN SERPL ELPH-MCNC: 3.1 G/DL — LOW (ref 3.3–5)
ALBUMIN SERPL ELPH-MCNC: 3.1 G/DL — LOW (ref 3.3–5)
ALBUMIN SERPL ELPH-MCNC: 3.3 G/DL — SIGNIFICANT CHANGE UP (ref 3.3–5)
ALP SERPL-CCNC: 64 U/L — SIGNIFICANT CHANGE UP (ref 40–120)
ALP SERPL-CCNC: 67 U/L — SIGNIFICANT CHANGE UP (ref 40–120)
ALP SERPL-CCNC: 71 U/L — SIGNIFICANT CHANGE UP (ref 40–120)
ALT FLD-CCNC: 10 U/L — SIGNIFICANT CHANGE UP (ref 10–45)
ALT FLD-CCNC: 9 U/L — LOW (ref 10–45)
ALT FLD-CCNC: 9 U/L — LOW (ref 10–45)
AMYLASE P1 CFR SERPL: 299 U/L — HIGH (ref 25–125)
AMYLASE P1 CFR SERPL: 303 U/L — HIGH (ref 25–125)
AMYLASE P1 CFR SERPL: 317 U/L — HIGH (ref 25–125)
ANION GAP SERPL CALC-SCNC: 11 MMOL/L — SIGNIFICANT CHANGE UP (ref 5–17)
ANION GAP SERPL CALC-SCNC: 11 MMOL/L — SIGNIFICANT CHANGE UP (ref 5–17)
ANION GAP SERPL CALC-SCNC: 8 MMOL/L — SIGNIFICANT CHANGE UP (ref 5–17)
APPEARANCE UR: CLEAR — SIGNIFICANT CHANGE UP
APTT BLD: 27.4 SEC — LOW (ref 27.5–35.5)
APTT BLD: 28.4 SEC — SIGNIFICANT CHANGE UP (ref 27.5–35.5)
APTT BLD: 28.4 SEC — SIGNIFICANT CHANGE UP (ref 27.5–35.5)
AST SERPL-CCNC: 12 U/L — SIGNIFICANT CHANGE UP (ref 10–40)
AST SERPL-CCNC: 12 U/L — SIGNIFICANT CHANGE UP (ref 10–40)
AST SERPL-CCNC: 9 U/L — LOW (ref 10–40)
BACTERIA # UR AUTO: NEGATIVE — SIGNIFICANT CHANGE UP
BASOPHILS # BLD AUTO: 0.01 K/UL — SIGNIFICANT CHANGE UP (ref 0–0.2)
BASOPHILS # BLD AUTO: 0.03 K/UL — SIGNIFICANT CHANGE UP (ref 0–0.2)
BASOPHILS # BLD AUTO: 0.03 K/UL — SIGNIFICANT CHANGE UP (ref 0–0.2)
BASOPHILS NFR BLD AUTO: 0.1 % — SIGNIFICANT CHANGE UP (ref 0–2)
BASOPHILS NFR BLD AUTO: 0.2 % — SIGNIFICANT CHANGE UP (ref 0–2)
BASOPHILS NFR BLD AUTO: 0.2 % — SIGNIFICANT CHANGE UP (ref 0–2)
BILIRUB DIRECT SERPL-MCNC: 0.1 MG/DL — SIGNIFICANT CHANGE UP (ref 0–0.3)
BILIRUB DIRECT SERPL-MCNC: 0.3 MG/DL — SIGNIFICANT CHANGE UP (ref 0–0.3)
BILIRUB DIRECT SERPL-MCNC: 0.4 MG/DL — HIGH (ref 0–0.3)
BILIRUB INDIRECT FLD-MCNC: 0.1 MG/DL — LOW (ref 0.2–1)
BILIRUB INDIRECT FLD-MCNC: 0.3 MG/DL — SIGNIFICANT CHANGE UP (ref 0.2–1)
BILIRUB INDIRECT FLD-MCNC: 0.4 MG/DL — SIGNIFICANT CHANGE UP (ref 0.2–1)
BILIRUB SERPL-MCNC: 0.2 MG/DL — SIGNIFICANT CHANGE UP (ref 0.2–1.2)
BILIRUB SERPL-MCNC: 0.6 MG/DL — SIGNIFICANT CHANGE UP (ref 0.2–1.2)
BILIRUB SERPL-MCNC: 0.8 MG/DL — SIGNIFICANT CHANGE UP (ref 0.2–1.2)
BILIRUB UR-MCNC: NEGATIVE — SIGNIFICANT CHANGE UP
BUN SERPL-MCNC: 13 MG/DL — SIGNIFICANT CHANGE UP (ref 7–23)
BUN SERPL-MCNC: 14 MG/DL — SIGNIFICANT CHANGE UP (ref 7–23)
BUN SERPL-MCNC: 14 MG/DL — SIGNIFICANT CHANGE UP (ref 7–23)
CALCIUM SERPL-MCNC: 8.6 MG/DL — SIGNIFICANT CHANGE UP (ref 8.4–10.5)
CALCIUM SERPL-MCNC: 8.7 MG/DL — SIGNIFICANT CHANGE UP (ref 8.4–10.5)
CALCIUM SERPL-MCNC: 8.8 MG/DL — SIGNIFICANT CHANGE UP (ref 8.4–10.5)
CALCOFLUOR WHITE SPEC: SIGNIFICANT CHANGE UP
CHLORIDE SERPL-SCNC: 123 MMOL/L — HIGH (ref 96–108)
CHLORIDE SERPL-SCNC: 125 MMOL/L — HIGH (ref 96–108)
CHLORIDE SERPL-SCNC: 127 MMOL/L — HIGH (ref 96–108)
CO2 SERPL-SCNC: 21 MMOL/L — LOW (ref 22–31)
CO2 SERPL-SCNC: 22 MMOL/L — SIGNIFICANT CHANGE UP (ref 22–31)
CO2 SERPL-SCNC: 24 MMOL/L — SIGNIFICANT CHANGE UP (ref 22–31)
COLOR SPEC: YELLOW — SIGNIFICANT CHANGE UP
CREAT SERPL-MCNC: 0.82 MG/DL — SIGNIFICANT CHANGE UP (ref 0.5–1.3)
CREAT SERPL-MCNC: 0.9 MG/DL — SIGNIFICANT CHANGE UP (ref 0.5–1.3)
CREAT SERPL-MCNC: 0.93 MG/DL — SIGNIFICANT CHANGE UP (ref 0.5–1.3)
DIFF PNL FLD: ABNORMAL
EGFR: 102 ML/MIN/1.73M2 — SIGNIFICANT CHANGE UP
EGFR: 105 ML/MIN/1.73M2 — SIGNIFICANT CHANGE UP
EGFR: 98 ML/MIN/1.73M2 — SIGNIFICANT CHANGE UP
EOSINOPHIL # BLD AUTO: 0 K/UL — SIGNIFICANT CHANGE UP (ref 0–0.5)
EOSINOPHIL # BLD AUTO: 0.04 K/UL — SIGNIFICANT CHANGE UP (ref 0–0.5)
EOSINOPHIL # BLD AUTO: 0.1 K/UL — SIGNIFICANT CHANGE UP (ref 0–0.5)
EOSINOPHIL NFR BLD AUTO: 0 % — SIGNIFICANT CHANGE UP (ref 0–6)
EOSINOPHIL NFR BLD AUTO: 0.3 % — SIGNIFICANT CHANGE UP (ref 0–6)
EOSINOPHIL NFR BLD AUTO: 0.7 % — SIGNIFICANT CHANGE UP (ref 0–6)
EPI CELLS # UR: 1 /HPF — SIGNIFICANT CHANGE UP
ESTIMATED AVERAGE GLUCOSE: 103 MG/DL — SIGNIFICANT CHANGE UP (ref 68–114)
GAS PNL BLDA: SIGNIFICANT CHANGE UP
GLUCOSE SERPL-MCNC: 130 MG/DL — HIGH (ref 70–99)
GLUCOSE SERPL-MCNC: 149 MG/DL — HIGH (ref 70–99)
GLUCOSE SERPL-MCNC: 233 MG/DL — HIGH (ref 70–99)
GLUCOSE UR QL: ABNORMAL
GRAM STN FLD: SIGNIFICANT CHANGE UP
HCT VFR BLD CALC: 31.7 % — LOW (ref 39–50)
HCT VFR BLD CALC: 32.1 % — LOW (ref 39–50)
HCT VFR BLD CALC: 32.4 % — LOW (ref 39–50)
HGB BLD-MCNC: 10.2 G/DL — LOW (ref 13–17)
HGB BLD-MCNC: 10.6 G/DL — LOW (ref 13–17)
HGB BLD-MCNC: 10.6 G/DL — LOW (ref 13–17)
HYALINE CASTS # UR AUTO: 1 /LPF — SIGNIFICANT CHANGE UP (ref 0–2)
IMM GRANULOCYTES NFR BLD AUTO: 1 % — SIGNIFICANT CHANGE UP (ref 0–1.5)
IMM GRANULOCYTES NFR BLD AUTO: 1.2 % — SIGNIFICANT CHANGE UP (ref 0–1.5)
IMM GRANULOCYTES NFR BLD AUTO: 1.4 % — SIGNIFICANT CHANGE UP (ref 0–1.5)
INR BLD: 1.14 RATIO — SIGNIFICANT CHANGE UP (ref 0.88–1.16)
INR BLD: 1.15 RATIO — SIGNIFICANT CHANGE UP (ref 0.88–1.16)
INR BLD: 1.26 RATIO — HIGH (ref 0.88–1.16)
KETONES UR-MCNC: SIGNIFICANT CHANGE UP
LEUKOCYTE ESTERASE UR-ACNC: NEGATIVE — SIGNIFICANT CHANGE UP
LIDOCAIN IGE QN: 31 U/L — SIGNIFICANT CHANGE UP (ref 7–60)
LIDOCAIN IGE QN: 45 U/L — SIGNIFICANT CHANGE UP (ref 7–60)
LIDOCAIN IGE QN: 45 U/L — SIGNIFICANT CHANGE UP (ref 7–60)
LYMPHOCYTES # BLD AUTO: 0.41 K/UL — LOW (ref 1–3.3)
LYMPHOCYTES # BLD AUTO: 1.03 K/UL — SIGNIFICANT CHANGE UP (ref 1–3.3)
LYMPHOCYTES # BLD AUTO: 1.61 K/UL — SIGNIFICANT CHANGE UP (ref 1–3.3)
LYMPHOCYTES # BLD AUTO: 11.8 % — LOW (ref 13–44)
LYMPHOCYTES # BLD AUTO: 2.8 % — LOW (ref 13–44)
LYMPHOCYTES # BLD AUTO: 7.1 % — LOW (ref 13–44)
MAGNESIUM SERPL-MCNC: 2.2 MG/DL — SIGNIFICANT CHANGE UP (ref 1.6–2.6)
MAGNESIUM SERPL-MCNC: 2.5 MG/DL — SIGNIFICANT CHANGE UP (ref 1.6–2.6)
MCHC RBC-ENTMCNC: 31.3 PG — SIGNIFICANT CHANGE UP (ref 27–34)
MCHC RBC-ENTMCNC: 31.6 PG — SIGNIFICANT CHANGE UP (ref 27–34)
MCHC RBC-ENTMCNC: 31.7 PG — SIGNIFICANT CHANGE UP (ref 27–34)
MCHC RBC-ENTMCNC: 32.2 GM/DL — SIGNIFICANT CHANGE UP (ref 32–36)
MCHC RBC-ENTMCNC: 32.7 GM/DL — SIGNIFICANT CHANGE UP (ref 32–36)
MCHC RBC-ENTMCNC: 33 GM/DL — SIGNIFICANT CHANGE UP (ref 32–36)
MCV RBC AUTO: 96.1 FL — SIGNIFICANT CHANGE UP (ref 80–100)
MCV RBC AUTO: 96.7 FL — SIGNIFICANT CHANGE UP (ref 80–100)
MCV RBC AUTO: 97.2 FL — SIGNIFICANT CHANGE UP (ref 80–100)
MONOCYTES # BLD AUTO: 0.29 K/UL — SIGNIFICANT CHANGE UP (ref 0–0.9)
MONOCYTES # BLD AUTO: 0.96 K/UL — HIGH (ref 0–0.9)
MONOCYTES # BLD AUTO: 1.22 K/UL — HIGH (ref 0–0.9)
MONOCYTES NFR BLD AUTO: 2 % — SIGNIFICANT CHANGE UP (ref 2–14)
MONOCYTES NFR BLD AUTO: 6.6 % — SIGNIFICANT CHANGE UP (ref 2–14)
MONOCYTES NFR BLD AUTO: 8.9 % — SIGNIFICANT CHANGE UP (ref 2–14)
NEUTROPHILS # BLD AUTO: 10.56 K/UL — HIGH (ref 1.8–7.4)
NEUTROPHILS # BLD AUTO: 12.22 K/UL — HIGH (ref 1.8–7.4)
NEUTROPHILS # BLD AUTO: 13.74 K/UL — HIGH (ref 1.8–7.4)
NEUTROPHILS NFR BLD AUTO: 77.4 % — HIGH (ref 43–77)
NEUTROPHILS NFR BLD AUTO: 84.6 % — HIGH (ref 43–77)
NEUTROPHILS NFR BLD AUTO: 93.7 % — HIGH (ref 43–77)
NITRITE UR-MCNC: NEGATIVE — SIGNIFICANT CHANGE UP
NRBC # BLD: 0 /100 WBCS — SIGNIFICANT CHANGE UP (ref 0–0)
PH UR: 6 — SIGNIFICANT CHANGE UP (ref 5–8)
PHOSPHATE SERPL-MCNC: 1.4 MG/DL — LOW (ref 2.5–4.5)
PHOSPHATE SERPL-MCNC: 2.3 MG/DL — LOW (ref 2.5–4.5)
PLATELET # BLD AUTO: 123 K/UL — LOW (ref 150–400)
PLATELET # BLD AUTO: 126 K/UL — LOW (ref 150–400)
PLATELET # BLD AUTO: 127 K/UL — LOW (ref 150–400)
POTASSIUM SERPL-MCNC: 3.2 MMOL/L — LOW (ref 3.5–5.3)
POTASSIUM SERPL-MCNC: 3.6 MMOL/L — SIGNIFICANT CHANGE UP (ref 3.5–5.3)
POTASSIUM SERPL-MCNC: 3.7 MMOL/L — SIGNIFICANT CHANGE UP (ref 3.5–5.3)
POTASSIUM SERPL-SCNC: 3.2 MMOL/L — LOW (ref 3.5–5.3)
POTASSIUM SERPL-SCNC: 3.6 MMOL/L — SIGNIFICANT CHANGE UP (ref 3.5–5.3)
POTASSIUM SERPL-SCNC: 3.7 MMOL/L — SIGNIFICANT CHANGE UP (ref 3.5–5.3)
PROT SERPL-MCNC: 6.4 G/DL — SIGNIFICANT CHANGE UP (ref 6–8.3)
PROT SERPL-MCNC: 6.4 G/DL — SIGNIFICANT CHANGE UP (ref 6–8.3)
PROT SERPL-MCNC: 6.8 G/DL — SIGNIFICANT CHANGE UP (ref 6–8.3)
PROT UR-MCNC: ABNORMAL
PROTHROM AB SERPL-ACNC: 13.2 SEC — SIGNIFICANT CHANGE UP (ref 10.5–13.4)
PROTHROM AB SERPL-ACNC: 13.3 SEC — SIGNIFICANT CHANGE UP (ref 10.5–13.4)
PROTHROM AB SERPL-ACNC: 14.7 SEC — HIGH (ref 10.5–13.4)
PSA FLD-MCNC: 0.19 NG/ML — SIGNIFICANT CHANGE UP (ref 0–4)
RBC # BLD: 3.26 M/UL — LOW (ref 4.2–5.8)
RBC # BLD: 3.34 M/UL — LOW (ref 4.2–5.8)
RBC # BLD: 3.35 M/UL — LOW (ref 4.2–5.8)
RBC # FLD: 13.9 % — SIGNIFICANT CHANGE UP (ref 10.3–14.5)
RBC CASTS # UR COMP ASSIST: 1 /HPF — SIGNIFICANT CHANGE UP (ref 0–4)
SODIUM SERPL-SCNC: 155 MMOL/L — HIGH (ref 135–145)
SODIUM SERPL-SCNC: 158 MMOL/L — HIGH (ref 135–145)
SODIUM SERPL-SCNC: 159 MMOL/L — HIGH (ref 135–145)
SP GR SPEC: 1.03 — HIGH (ref 1.01–1.02)
SPECIMEN SOURCE: SIGNIFICANT CHANGE UP
SPECIMEN SOURCE: SIGNIFICANT CHANGE UP
TROPONIN T, HIGH SENSITIVITY RESULT: <6 NG/L — SIGNIFICANT CHANGE UP (ref 0–51)
UROBILINOGEN FLD QL: NEGATIVE — SIGNIFICANT CHANGE UP
WBC # BLD: 13.65 K/UL — HIGH (ref 3.8–10.5)
WBC # BLD: 14.46 K/UL — HIGH (ref 3.8–10.5)
WBC # BLD: 14.66 K/UL — HIGH (ref 3.8–10.5)
WBC # FLD AUTO: 13.65 K/UL — HIGH (ref 3.8–10.5)
WBC # FLD AUTO: 14.46 K/UL — HIGH (ref 3.8–10.5)
WBC # FLD AUTO: 14.66 K/UL — HIGH (ref 3.8–10.5)
WBC UR QL: 1 /HPF — SIGNIFICANT CHANGE UP (ref 0–5)

## 2022-08-15 PROCEDURE — 93010 ELECTROCARDIOGRAM REPORT: CPT

## 2022-08-15 PROCEDURE — 71045 X-RAY EXAM CHEST 1 VIEW: CPT | Mod: 26

## 2022-08-15 PROCEDURE — 31624 DX BRONCHOSCOPE/LAVAGE: CPT | Mod: GC

## 2022-08-15 PROCEDURE — 71250 CT THORAX DX C-: CPT | Mod: 26

## 2022-08-15 PROCEDURE — 99221 1ST HOSP IP/OBS SF/LOW 40: CPT | Mod: GC,25

## 2022-08-15 PROCEDURE — 93454 CORONARY ARTERY ANGIO S&I: CPT | Mod: 26

## 2022-08-15 PROCEDURE — 74176 CT ABD & PELVIS W/O CONTRAST: CPT | Mod: 26

## 2022-08-15 PROCEDURE — 31645 BRNCHSC W/THER ASPIR 1ST: CPT | Mod: GC

## 2022-08-15 PROCEDURE — 76700 US EXAM ABDOM COMPLETE: CPT | Mod: 26

## 2022-08-15 RX ORDER — CHLORHEXIDINE GLUCONATE 213 G/1000ML
15 SOLUTION TOPICAL EVERY 12 HOURS
Refills: 0 | Status: DISCONTINUED | OUTPATIENT
Start: 2022-08-15 | End: 2022-08-16

## 2022-08-15 RX ORDER — VANCOMYCIN HCL 1 G
1000 VIAL (EA) INTRAVENOUS EVERY 12 HOURS
Refills: 0 | Status: DISCONTINUED | OUTPATIENT
Start: 2022-08-15 | End: 2022-08-17

## 2022-08-15 RX ORDER — POTASSIUM PHOSPHATE, MONOBASIC POTASSIUM PHOSPHATE, DIBASIC 236; 224 MG/ML; MG/ML
30 INJECTION, SOLUTION INTRAVENOUS ONCE
Refills: 0 | Status: COMPLETED | OUTPATIENT
Start: 2022-08-15 | End: 2022-08-15

## 2022-08-15 RX ORDER — PIPERACILLIN AND TAZOBACTAM 4; .5 G/20ML; G/20ML
INJECTION, POWDER, LYOPHILIZED, FOR SOLUTION INTRAVENOUS
Refills: 0 | Status: DISCONTINUED | OUTPATIENT
Start: 2022-08-15 | End: 2022-08-15

## 2022-08-15 RX ORDER — INSULIN LISPRO 100/ML
VIAL (ML) SUBCUTANEOUS EVERY 6 HOURS
Refills: 0 | Status: DISCONTINUED | OUTPATIENT
Start: 2022-08-15 | End: 2022-08-17

## 2022-08-15 RX ORDER — SODIUM CHLORIDE 9 MG/ML
1000 INJECTION, SOLUTION INTRAVENOUS
Refills: 0 | Status: DISCONTINUED | OUTPATIENT
Start: 2022-08-15 | End: 2022-08-17

## 2022-08-15 RX ORDER — NICARDIPINE HYDROCHLORIDE 30 MG/1
5 CAPSULE, EXTENDED RELEASE ORAL
Qty: 40 | Refills: 0 | Status: DISCONTINUED | OUTPATIENT
Start: 2022-08-15 | End: 2022-08-17

## 2022-08-15 RX ORDER — FUROSEMIDE 40 MG
40 TABLET ORAL ONCE
Refills: 0 | Status: COMPLETED | OUTPATIENT
Start: 2022-08-15 | End: 2022-08-16

## 2022-08-15 RX ORDER — PIPERACILLIN AND TAZOBACTAM 4; .5 G/20ML; G/20ML
3.38 INJECTION, POWDER, LYOPHILIZED, FOR SOLUTION INTRAVENOUS EVERY 6 HOURS
Refills: 0 | Status: DISCONTINUED | OUTPATIENT
Start: 2022-08-15 | End: 2022-08-17

## 2022-08-15 RX ORDER — PIPERACILLIN AND TAZOBACTAM 4; .5 G/20ML; G/20ML
3.38 INJECTION, POWDER, LYOPHILIZED, FOR SOLUTION INTRAVENOUS ONCE
Refills: 0 | Status: COMPLETED | OUTPATIENT
Start: 2022-08-15 | End: 2022-08-15

## 2022-08-15 RX ADMIN — Medication 25 MICROGRAM(S)/HR: at 20:07

## 2022-08-15 RX ADMIN — VASOPRESSIN 2.4 UNIT(S)/MIN: 20 INJECTION INTRAVENOUS at 20:07

## 2022-08-15 RX ADMIN — CHLORHEXIDINE GLUCONATE 15 MILLILITER(S): 213 SOLUTION TOPICAL at 21:06

## 2022-08-15 RX ADMIN — PIPERACILLIN AND TAZOBACTAM 25 GRAM(S): 4; .5 INJECTION, POWDER, LYOPHILIZED, FOR SOLUTION INTRAVENOUS at 18:30

## 2022-08-15 RX ADMIN — CHLORHEXIDINE GLUCONATE 15 MILLILITER(S): 213 SOLUTION TOPICAL at 05:34

## 2022-08-15 RX ADMIN — CHLORHEXIDINE GLUCONATE 15 MILLILITER(S): 213 SOLUTION TOPICAL at 18:46

## 2022-08-15 RX ADMIN — CHLORHEXIDINE GLUCONATE 1 APPLICATION(S): 213 SOLUTION TOPICAL at 05:15

## 2022-08-15 RX ADMIN — PIPERACILLIN AND TAZOBACTAM 200 GRAM(S): 4; .5 INJECTION, POWDER, LYOPHILIZED, FOR SOLUTION INTRAVENOUS at 05:35

## 2022-08-15 RX ADMIN — PIPERACILLIN AND TAZOBACTAM 25 GRAM(S): 4; .5 INJECTION, POWDER, LYOPHILIZED, FOR SOLUTION INTRAVENOUS at 11:46

## 2022-08-15 RX ADMIN — POTASSIUM PHOSPHATE, MONOBASIC POTASSIUM PHOSPHATE, DIBASIC 83.33 MILLIMOLE(S): 236; 224 INJECTION, SOLUTION INTRAVENOUS at 18:30

## 2022-08-15 RX ADMIN — SODIUM CHLORIDE 50 MILLILITER(S): 9 INJECTION, SOLUTION INTRAVENOUS at 20:07

## 2022-08-15 RX ADMIN — Medication 58 MILLIGRAM(S): at 11:46

## 2022-08-15 RX ADMIN — PANTOPRAZOLE SODIUM 40 MILLIGRAM(S): 20 TABLET, DELAYED RELEASE ORAL at 11:46

## 2022-08-15 NOTE — PROCEDURE NOTE - NSBRONCHFINDINGS_GEN_A_CORE_FT
Right and left bronchial tree normal to the subsegmental level with no endobronchial lesions or masses. There was secretions noted in the LLL whihc were cleared.    Secretions were mild and yellow in color in the lower lobes. They were easily removed with suctioning. There were three lavages of 20cc in total, 4 BAL. Samples were sent for cultures as per [facility] guidelines.

## 2022-08-15 NOTE — CONSULT NOTE ADULT - ATTENDING COMMENTS
Patient with devastating intracranially process now declared brain dead. His family has consented for organ donation and Pulmonary team called for bronchoscopy with airway evaluation and BAL to help determine whether or not lungs would be appropriate for transplantation.     Bronchoscopy performed and samples provided to bedside nurse and [facility] liaison. Further management as per [facility]

## 2022-08-15 NOTE — PROGRESS NOTE ADULT - SUBJECTIVE AND OBJECTIVE BOX
O:  brain dead     T(C): 36.5 (08-14-22 @ 07:00), Max: 37.1 (08-13-22 @ 16:00)  HR: 86 (08-14-22 @ 11:00) (75 - 112)  BP: --  RR: 20 (08-14-22 @ 11:00) (0 - 20)  SpO2: 100% (08-14-22 @ 11:00) (97% - 100%)  08-13-22 @ 07:01  -  08-14-22 @ 07:00  --------------------------------------------------------  IN: 2612.9 mL / OUT: 1625 mL / NET: 987.9 mL    08-14-22 @ 07:01  -  08-14-22 @ 11:36  --------------------------------------------------------  IN: 334.6 mL / OUT: 100 mL / NET: 234.6 mL    chlorhexidine 0.12% Liquid 15 milliLiter(s) Oral Mucosa every 12 hours  chlorhexidine 4% Liquid 1 Application(s) Topical <User Schedule>  dextrose 5% + sodium chloride 0.45%. 1000 milliLiter(s) IV Continuous <Continuous>  hydrocortisone sodium succinate Injectable 50 milliGRAM(s) IV Push every 8 hours  norepinephrine Infusion 0.05 MICROgram(s)/kG/Min IV Continuous <Continuous>  pantoprazole  Injectable 40 milliGRAM(s) IV Push daily  phenylephrine    Infusion 0.1 MICROgram(s)/kG/Min IV Continuous <Continuous>  Mode: AC/ CMV (Assist Control/ Continuous Mandatory Ventilation), RR (machine): 20, TV (machine): 450, FiO2: 100, PEEP: 5, ITime: 1, MAP: 10, PIP: 20  PHYSICAL EXAM:    General: No Acute Distress     Neurological: pupils 3 mm fixed, no corneal, no gag, no cough, 0/5 all over     Pulmonary: Clear to Auscultation, No Rales, No Rhonchi, No Wheezes     Cardiovascular: S1, S2, Regular Rate and Rhythm     Gastrointestinal: Soft, Nontender, Nondistended     Extremities: No calf tenderness     Incision:         LABS:  Na: 153 (08-13 @ 13:21), 156 (08-13 @ 02:20), 155 (08-12 @ 21:28), 154 (08-12 @ 18:34), 151 (08-12 @ 11:03)  K: 3.8 (08-13 @ 13:21), 3.8 (08-13 @ 02:20), 4.1 (08-12 @ 21:28), 4.0 (08-12 @ 18:34), 3.9 (08-12 @ 11:03)  Cl: 120 (08-13 @ 13:21), 126 (08-13 @ 02:20), 126 (08-12 @ 21:28), 125 (08-12 @ 18:34), 121 (08-12 @ 11:03)  CO2: 22 (08-13 @ 13:21), 20 (08-13 @ 02:20), 18 (08-12 @ 21:28), 20 (08-12 @ 18:34), 20 (08-12 @ 11:03)  BUN: 14 (08-13 @ 13:21), 13 (08-13 @ 02:20), 13 (08-12 @ 21:28), 14 (08-12 @ 18:34), 15 (08-12 @ 11:03)  Cr: 0.98 (08-13 @ 13:21), 1.02 (08-13 @ 02:20), 1.12 (08-12 @ 21:28), 1.19 (08-12 @ 18:34), 1.12 (08-12 @ 11:03)  Glu: 151(08-13 @ 13:21), 129(08-13 @ 02:20), 112(08-12 @ 21:28), 98(08-12 @ 18:34), 67(08-12 @ 11:03)    Hgb: 13.2 (08-13 @ 02:20), 13.0 (08-12 @ 21:28), 13.6 (08-12 @ 18:34), 12.5 (08-12 @ 11:03)  Hct: 40.3 (08-13 @ 02:20), 40.2 (08-12 @ 21:28), 40.9 (08-12 @ 18:34), 37.9 (08-12 @ 11:03)  WBC: 13.83 (08-13 @ 02:20), 14.78 (08-12 @ 21:28), 14.13 (08-12 @ 18:34), 12.80 (08-12 @ 11:03)  Plt: 156 (08-13 @ 02:20), 158 (08-12 @ 21:28), 175 (08-12 @ 18:34), 158 (08-12 @ 11:03)    INR: 1.09 08-12-22 @ 12:57  PTT: 32.3 08-12-22 @ 12:57                A/P:  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

## 2022-08-15 NOTE — PROCEDURE NOTE - NSBRONCHPROCDETAILS_GEN_A_CORE_FT
Bronchoscope inserted through ET tube, ET tube noted above the andry. Evaluation of Rt and Lt bronchial trees to the subsegmental level. BAL taken from LLL and RML. Therapeutic aspiration of secretions was performed. Bronchoscope was withdrawn from ET tube.

## 2022-08-15 NOTE — CONSULT NOTE ADULT - SUBJECTIVE AND OBJECTIVE BOX
CHIEF COMPLAINT:Patient is a 52y old  Male who presents with a chief complaint of cerebral vascular accident (13 Aug 2022 15:06)      HPI:  52 M p/w CVA, transferred from [place], found to have possible clot on CT head for which he was transferred for, underwent catheter cerebral angiogram and stroke intervention however was determined to be clinically brain dead by NSICU team. Pulmonology was consulted for bronchoscopy for lung donation workup.      PAST MEDICAL & SURGICAL HISTORY:  No pertinent past medical history      No significant past surgical history          FAMILY HISTORY:      SOCIAL HISTORY:  Smoking: [ ] Never Smoked [ ] Former Smoker (__ packs x ___ years) [ ] Current Smoker  (__ packs x ___ years)  Substance Use: [ ] Never Used [ ] Used ____  EtOH Use:  Marital Status: [ ] Single [ ]  [ ]  [ ]   Sexual History:   Occupation:  Recent Travel:  Country of Birth:  Advance Directives:    Allergies    Allergy Status Unknown    Intolerances        HOME MEDICATIONS:  Home Medications:      REVIEW OF SYSTEMS:  [x ] Unable to assess ROS    OBJECTIVE:  ICU Vital Signs Last 24 Hrs  T(C): 36.6 (15 Aug 2022 19:00), Max: 37.1 (15 Aug 2022 02:30)  T(F): 97.9 (15 Aug 2022 19:00), Max: 98.8 (15 Aug 2022 02:30)  HR: 86 (15 Aug 2022 20:15) (77 - 105)  BP: 173/99 (15 Aug 2022 02:45) (173/99 - 173/99)  BP(mean): 129 (15 Aug 2022 02:45) (129 - 129)  ABP: 152/77 (15 Aug 2022 20:15) (112/59 - 193/96)  ABP(mean): 107 (15 Aug 2022 20:15) (81 - 138)  RR: 18 (15 Aug 2022 20:15) (18 - 23)  SpO2: 100% (15 Aug 2022 20:15) (94% - 100%)    O2 Parameters below as of 15 Aug 2022 19:00  Patient On (Oxygen Delivery Method): ventilator    O2 Concentration (%): 100      Mode: AC/ CMV (Assist Control/ Continuous Mandatory Ventilation), RR (machine): 20, TV (machine): 450, FiO2: 100, PEEP: 12, ITime: 1.5, MAP: 19, PC: 14, PIP: 26    08-14 @ 07:01  -  08-15 @ 07:00  --------------------------------------------------------  IN: 2207.4 mL / OUT: 3405 mL / NET: -1197.6 mL    08-15 @ 07:01  -  08-15 @ 20:36  --------------------------------------------------------  IN: 1472.8 mL / OUT: 1435 mL / NET: 37.8 mL      CAPILLARY BLOOD GLUCOSE      POCT Blood Glucose.: 107 mg/dL (14 Aug 2022 12:00)      PHYSICAL EXAM:  GENERAL: intubated  Lung: b/l vented breath sounds  Neuro: unresponsive    HOSPITAL MEDICATIONS:  Standing Meds:  chlorhexidine 0.12% Liquid 15 milliLiter(s) Oral Mucosa every 12 hours  chlorhexidine 4% Liquid 1 Application(s) Topical <User Schedule>  dextrose 5% + sodium chloride 0.45%. 1000 milliLiter(s) IV Continuous <Continuous>  levothyroxine Infusion 10 MICROgram(s)/Hr IV Continuous <Continuous>  methylPREDNISolone sodium succinate IVPB 1000 milliGRAM(s) IV Intermittent every 24 hours  niCARdipine Infusion 5 mG/Hr IV Continuous <Continuous>  pantoprazole  Injectable 40 milliGRAM(s) IV Push daily  phenylephrine    Infusion 0.1 MICROgram(s)/kG/Min IV Continuous <Continuous>  piperacillin/tazobactam IVPB.. 3.375 Gram(s) IV Intermittent every 6 hours  vasopressin Infusion 0.04 Unit(s)/Min IV Continuous <Continuous>      PRN Meds:      LABS:    The Labs were reviewed by me   The Radiology was reviewed by me    EKG tracing reviewed by me    08-15    155<H>  |  123<H>  |  14  ----------------------------<  233<H>  3.6   |  21<L>  |  0.82  08-15    159<H>  |  127<H>  |  13  ----------------------------<  149<H>  3.2<L>   |  24  |  0.93  08-15    158<H>  |  125<H>  |  14  ----------------------------<  130<H>  3.7   |  22  |  0.90    Ca    8.8      15 Aug 2022 19:13  Ca    8.6      15 Aug 2022 12:44  Ca    8.7      15 Aug 2022 05:50  Phos  2.3     08-15  Mg     2.2     08-15    TPro  6.8  /  Alb  3.3  /  TBili  0.8  /  DBili  0.4<H>  /  AST  12  /  ALT  9<L>  /  AlkPhos  71  08-15  TPro  6.4  /  Alb  3.1<L>  /  TBili  0.6  /  DBili  0.3  /  AST  9<L>  /  ALT  10  /  AlkPhos  64  08-15  TPro  6.4  /  Alb  3.1<L>  /  TBili  0.2  /  DBili  0.1  /  AST  12  /  ALT  9<L>  /  AlkPhos  67  08-15    Magnesium, Serum: 2.2 mg/dL (08-15-22 @ 19:13)  Magnesium, Serum: 2.5 mg/dL (08-15-22 @ 12:44)  Magnesium, Serum: 2.4 mg/dL (08-14-22 @ 16:22)  Magnesium, Serum: 2.4 mg/dL (08-13-22 @ 13:21)  Magnesium, Serum: 2.4 mg/dL (08-13-22 @ 02:20)    Phosphorus Level, Serum: 2.3 mg/dL (08-15-22 @ 19:13)  Phosphorus Level, Serum: 1.4 mg/dL (08-15-22 @ 12:44)  Phosphorus Level, Serum: 1.8 mg/dL (08-14-22 @ 16:22)  Phosphorus Level, Serum: 2.3 mg/dL (08-13-22 @ 13:21)  Phosphorus Level, Serum: 3.4 mg/dL (08-13-22 @ 02:20)      PT/INR - ( 15 Aug 2022 19:13 )   PT: 14.7 sec;   INR: 1.26 ratio         PTT - ( 15 Aug 2022 19:13 )  PTT:28.4 sec                ABG - ( 15 Aug 2022 19:02 )  pH, Arterial: 7.45  pH, Blood: x     /  pCO2: 32    /  pO2: 268   / HCO3: 22    / Base Excess: -1.2  /  SaO2: 98.7                                    10.6   14.66 )-----------( 127      ( 15 Aug 2022 19:13 )             32.1                         10.2   13.65 )-----------( 123      ( 15 Aug 2022 12:44 )             31.7                         10.6   14.46 )-----------( 126      ( 15 Aug 2022 05:51 )             32.4     CAPILLARY BLOOD GLUCOSE

## 2022-08-15 NOTE — PROCEDURE NOTE - NSINFORMCONSENT_GEN_A_CORE
Benefits, risks, and possible complications of procedure explained to patient/caregiver who verbalized understanding and gave written consent. from family ([facility] consent) - in chart/Benefits, risks, and possible complications of procedure explained to patient/caregiver who verbalized understanding and gave verbal consent.

## 2022-08-15 NOTE — PROCEDURE NOTE - SUPERVISORY STATEMENT
bronchoscopy performed for evaluation of airways and determination for organ donation. consent obtained and reviewed in chart from Juan. discussed with bedside RN and Juan liaison Rakan.    Patient previously declared brain dead by primary team. Remains on mechanical vent. Bronchoscopy performed with therapeutic aspiration of secretions/clearance and BAL of LLL for evaluation/analysis. ETT 4-5 cm above the andry. Thin, yellowish, secretions in the lower lobes which were suction easily

## 2022-08-15 NOTE — CONSULT NOTE ADULT - SUBJECTIVE AND OBJECTIVE BOX
CHIEF COMPLAINT:    HISTORY OF PRESENT ILLNESS:  53 yo M transferred for unresponsiveness, arrives intubated with suspected cerebellar infarct found on CT head. Pt send for neuro IR.  Pt. was found with girlfriend who appeared intoxicated.  She admits to pt. using drugs.  No response to Narcan on scene.  Pt. found to be febrile in ER.  Intubated for airway protection  s/p thrombectomy of basilar artery TICI 2c  Deemed brain dead and is awaiting organ harvesting including heart.     PAST MEDICAL & SURGICAL HISTORY:  No pertinent past medical history  No significant past surgical history    MEDICATIONS:  niCARdipine Infusion 5 mG/Hr IV Continuous <Continuous>  phenylephrine    Infusion 0.1 MICROgram(s)/kG/Min IV Continuous <Continuous>    piperacillin/tazobactam IVPB.. 3.375 Gram(s) IV Intermittent every 6 hours        pantoprazole  Injectable 40 milliGRAM(s) IV Push daily    levothyroxine Infusion 10 MICROgram(s)/Hr IV Continuous <Continuous>  methylPREDNISolone sodium succinate IVPB 1000 milliGRAM(s) IV Intermittent every 24 hours  vasopressin Infusion 0.04 Unit(s)/Min IV Continuous <Continuous>    chlorhexidine 0.12% Liquid 15 milliLiter(s) Oral Mucosa every 12 hours  chlorhexidine 4% Liquid 1 Application(s) Topical <User Schedule>  dextrose 5% + sodium chloride 0.45%. 1000 milliLiter(s) IV Continuous <Continuous>      FAMILY HISTORY:      SOCIAL HISTORY:    [ ] Non-smoker  [ ] Smoker  [ ] Alcohol    Allergies    Allergy Status Unknown    Intolerances    	    REVIEW OF SYSTEMS:      [ ] All others negative	  [XX ] Unable to obtain    PHYSICAL EXAM:  T(C): 36.1 (08-15-22 @ 07:00), Max: 37.1 (08-15-22 @ 02:30)  HR: 83 (08-15-22 @ 09:14) (83 - 105)  BP: 173/99 (08-15-22 @ 02:45) (173/99 - 173/99)  RR: 20 (08-15-22 @ 09:00) (20 - 23)  SpO2: 99% (08-15-22 @ 09:14) (94% - 100%)  Wt(kg): --  I&O's Summary    14 Aug 2022 07:01  -  15 Aug 2022 07:00  --------------------------------------------------------  IN: 2207.4 mL / OUT: 3405 mL / NET: -1197.6 mL    15 Aug 2022 07:01  -  15 Aug 2022 09:30  --------------------------------------------------------  IN: 179.8 mL / OUT: 600 mL / NET: -420.2 mL        Appearance: NAD Intubated sedated 	  HEENT:   dry  oral mucosa  Lymphatic: No lymphadenopathy +NGT   Cardiovascular: Normal S1 S2, No JVD, No murmurs, No edema  Respiratory: Ventilated   Psychiatry: A & O x 0  Gastrointestinal:  Soft, Non-tender, + BS	  Skin: No rashes, No ecchymoses, No cyanosis	  Extremities: decreased  range of motion, No clubbing, cyanosis or edema  Vascular: Peripheral pulses palpable 2+ bilaterally  Neurological: pupils 3 mm fixed, no corneal, no gag, no cough, 0/5 all over     TELEMETRY: 	  SR  ECG:  	NSR non specific stt changes     < from: TTE with Doppler (w/Cont) (08.12.22 @ 15:26) >    Patient name: LAKESHA GRACIA  YOB: 1970   Age: 52 (M)   MR#: [id number]  Study Date: 8/12/2022  [facility] Cirilo [facility] RDCS  Study quality: Technically difficult  Referring Physician: Jarek Salas MD  Blood Pressure: 116/69 mmHg  Height: 173 cm  Weight: 100 kg  BSA: 2.1 m2  ------------------------------------------------------------------------  PROCEDURE: Transthoracic echocardiogram with 2-D, M-Mode  and complete spectral and color flow Doppler. Verbal  consent was obtained for injection of  Ultrasonic Enhancing  Agent following a discussion of risks and benefits.  Following intravenous injection of Ultrasonic Enhancing  Agent, harmonic imaging was performed.  INDICATION: Other cerebrovascular disease (I67.89)  ------------------------------------------------------------------------  Dimensions:    Normal Values:  LA:     3.6    2.0 - 4.0 cm  Ao:     3.1    2.0 - 3.8 cm  SEPTUM: 1.1    0.6 - 1.2 cm  PWT:    1.1    0.6 - 1.1 cm  LVIDd:  4.3    3.0 - 5.6 cm  LVIDs:  2.8    1.8 - 4.0 cm  Derived variables:  LVMI: 76 g/m2  RWT: 0.51  Fractional short: 35 %  EF (Austin Rule): 72 %Doppler Peak Velocity (m/sec):  AoV=1.4 TV=2.7  ------------------------------------------------------------------------  Observations:  Mitral Valve: Normal mitral valve.  Aortic Valve/Aorta: Normal trileaflet aortic valve. Peak  transaortic valve gradient equals 8 mm Hg. Peak left  ventricular outflow tract gradient equals 3 mm Hg, mean  gradient is equal to 1 mm Hg, LVOT velocity time integral  equals 17 cm.  Aortic Root: 3.1 cm.  Left Atrium: Normal left atrium.  LA volume index = 33  cc/m2.  Left Ventricle: Normal left ventricular systolic function.  No segmental wall motion abnormalities.   Endocardial  visualization enhanced with intravenous injection of  Ultrasonic Enhancing Agent (Lumason). No left ventricular  thrombus. Increased relative wall thickness with normal  left ventricular mass index, consistent with concentric  left ventricular remodeling. Mild diastolic dysfunction  (Stage I).  Right Heart: Normal right atrium. Normal right ventricular  size and function. Normal tricuspid valve. Normal pulmonic  valve. Minimal pulmonic regurgitation.  Pericardium/Pleura: Normal pericardium with no pericardial  effusion.  Hemodynamic: Estimated right atrial pressure is 8 mm Hg.  Color Doppler demonstrates no evidence of a patent foramen  ovale.  ------------------------------------------------------------------------  Conclusions:  1. Increased relative wall thickness with normal left  ventricular mass index, consistent with concentric left  ventricular remodeling.  2. Normal left ventricular systolic function. No segmental  wall motion abnormalities.   Endocardial visualization  enhanced with intravenous injection of Ultrasonic Enhancing  Agent (Lumason). No left ventricular thrombus.  3. Normal right ventricular size and function.  *** No previous Echo exam.  ------------------------------------------------------------------------  Confirmed on  8/12/2022 - 17:15:02 by PENG Tobias  ------------------------------------------------------------------------    < end of copied text >    RADIOLOGY:  < from: CT Head No Cont (08.13.22 @ 09:42) >    ACC: [id number] EXAM:  CT BRAIN                          PROCEDURE DATE:  08/13/2022          INTERPRETATION:  CT head without IV contrast    CLINICAL INFORMATION: Basilar occlusion    TECHNIQUE: Contiguous axial 5 mm sections were obtained through the head.   Sagittal and coronal 2-D reformatted images were also obtained.   This   scan was performed using automatic exposure control (radiation dose   reduction software) to obtain a diagnostic image quality scan with   patient dose as low as reasonably achievable.    FINDINGS:   CT dated 08/12/2022 available for review.    The brain demonstrates progressive edema throughout the white matter and   loss of gray-white differentiation in the BILATERAL occipital and   posterior temporal lobes, ponsand cerebellum consistent with acute   infarctions. There is effacement of all basilar cisterns with   transtentorial herniation. Tiny subdural hematoma seen along the   tentorium. The lateral and third ventricles are normal in size, the   fourth is occluded.    The orbits are unremarkable.  The paranasal sinuses are clear.  The nasal   cavity appears intact.  The nasopharynx is symmetric.  The central skull   base, petrous temporal bones and calvarium remain intact.      IMPRESSION:   progressive edema throughout the white matter and loss of   gray-white differentiation in the BILATERAL occipital and posterior   temporal lobes, mayela and cerebellum consistent with acute infarctions.   There is effacement of all basilar cisterns with transtentorial   herniation. Tiny subdural hematoma seen along the tentorium. The lateral   and third ventricles are normal in size, the fourth is occluded.      --- End of Report ---            SEAN BENJAMIN MD; Attending Radiologist  This document has been electronically signed. Aug 13 2022 10:51AM    < end of copied text >  < from: CT Head No Cont (08.12.22 @ 15:04) >    ACC: [id number] EXAM:  CT BRAIN                          PROCEDURE DATE:  08/12/2022          INTERPRETATION:  CLINICAL INDICATION: Basilar occlusion with cerebellar    CVA    5mm axial sections of the brain were obtained from base to vertex,   without the intravenous administration of contrast material. Images were   obtained on a portable CT scanner and compared with 8:00 AM.    There is increasing lucency in the posterior fossa including the   brainstem and cerebellar hemispheres consistent with infarction, with   increasing effacement of the fourth ventricle and quadrigeminal plate   cistern. There is increased rostral hydrocephalus which is increased   since the prior exam.      IMPRESSION: Increasing lucency in the posterior fossa consistent with   infarction of the brain stem and cerebellum with increasing mass effect   and rostral hydrocephalus compared with 8:00 AM.    Dr. Hong discussed these findings with ANI Gar on 8/12/2022 3:12 PM   with read back.    --- End of Report ---            TONY HONG MD; Attending Radiologist  This document has been electronically signed. Aug 12 2022  3:15PM    < end of copied text >  < from: CT Head No Cont (08.12.22 @ 08:16) >    ACC: [id number] EXAM:  CT BRAIN                          PROCEDURE DATE:  08/12/2022          INTERPRETATION:  INDICATIONS:  stroke    TECHNIQUE:  Serial axial images were obtained from the skull base to the   vertex without intravenous contrast. Sagittal and Coronal reformats were   performed    COMPARISON EXAMINATION: None.    FINDINGS:  VENTRICLES AND SULCI:  Subtle mass effect on the fourth ventricle.  INTRA-AXIAL:  Lucency at the level of the bilateral cerebellar   hemispheres the brainstem and the bilateral PCA territories suspicious   for basilar occlusion. High attenuation in the basilar artery is   suggested.  EXTRA-AXIAL:  No mass or collection is seen.  VISUALIZED SINUSES:  Clear.  VISUALIZED MASTOIDS:  Clear.  CALVARIUM:  Normal.  MISCELLANEOUS:  None.    IMPRESSION:  Lucency at the level of the bilateral cerebellar hemispheres   the brainstem as well as the bilateral PCA territories suspicious for   basilar occlusion. High attenuation identified within the basilar is well   suspicious for thrombus. No associated hemorrhage appreciated at this time    --- End of Report ---            FINA COBB MD; Attending Radiologist  This document has been electronically signed. Aug 12 2022 10:24AM    < end of copied text >    OTHER: 	   	  LABS:	 	    CARDIAC MARKERS:                                  10.6   14.46 )-----------( 126      ( 15 Aug 2022 05:51 )             32.4     08-15    158<H>  |  125<H>  |  14  ----------------------------<  130<H>  3.7   |  22  |  0.90    Ca    8.7      15 Aug 2022 05:50  Phos  1.8     08-14  Mg     2.4     08-14    TPro  6.4  /  Alb  3.1<L>  /  TBili  0.2  /  DBili  0.1  /  AST  12  /  ALT  9<L>  /  AlkPhos  67  08-15    proBNP:   Lipid Profile:   HgA1c:   TSH:

## 2022-08-15 NOTE — CONSULT NOTE ADULT - PROBLEM SELECTOR RECOMMENDATION 9
s/p thrombectomy of basilar artery TICI 2c  Deemed brain dead and is awaiting organ harvesting including heart.

## 2022-08-16 PROBLEM — Z00.00 ENCOUNTER FOR PREVENTIVE HEALTH EXAMINATION: Noted: 2022-08-16

## 2022-08-16 LAB
ALBUMIN SERPL ELPH-MCNC: 3.3 G/DL — SIGNIFICANT CHANGE UP (ref 3.3–5)
ALBUMIN SERPL ELPH-MCNC: 3.3 G/DL — SIGNIFICANT CHANGE UP (ref 3.3–5)
ALBUMIN SERPL ELPH-MCNC: 3.5 G/DL — SIGNIFICANT CHANGE UP (ref 3.3–5)
ALBUMIN SERPL ELPH-MCNC: 3.5 G/DL — SIGNIFICANT CHANGE UP (ref 3.3–5)
ALP SERPL-CCNC: 73 U/L — SIGNIFICANT CHANGE UP (ref 40–120)
ALP SERPL-CCNC: 74 U/L — SIGNIFICANT CHANGE UP (ref 40–120)
ALP SERPL-CCNC: 75 U/L — SIGNIFICANT CHANGE UP (ref 40–120)
ALP SERPL-CCNC: 77 U/L — SIGNIFICANT CHANGE UP (ref 40–120)
ALT FLD-CCNC: 10 U/L — SIGNIFICANT CHANGE UP (ref 10–45)
ALT FLD-CCNC: 10 U/L — SIGNIFICANT CHANGE UP (ref 10–45)
ALT FLD-CCNC: 11 U/L — SIGNIFICANT CHANGE UP (ref 10–45)
ALT FLD-CCNC: 9 U/L — LOW (ref 10–45)
AMYLASE P1 CFR SERPL: 175 U/L — HIGH (ref 25–125)
AMYLASE P1 CFR SERPL: 208 U/L — HIGH (ref 25–125)
AMYLASE P1 CFR SERPL: 245 U/L — HIGH (ref 25–125)
AMYLASE P1 CFR SERPL: 280 U/L — HIGH (ref 25–125)
ANION GAP SERPL CALC-SCNC: 12 MMOL/L — SIGNIFICANT CHANGE UP (ref 5–17)
ANION GAP SERPL CALC-SCNC: 13 MMOL/L — SIGNIFICANT CHANGE UP (ref 5–17)
ANION GAP SERPL CALC-SCNC: 13 MMOL/L — SIGNIFICANT CHANGE UP (ref 5–17)
ANION GAP SERPL CALC-SCNC: 14 MMOL/L — SIGNIFICANT CHANGE UP (ref 5–17)
APPEARANCE UR: CLEAR — SIGNIFICANT CHANGE UP
APTT BLD: 27.2 SEC — LOW (ref 27.5–35.5)
APTT BLD: 27.3 SEC — LOW (ref 27.5–35.5)
APTT BLD: 27.4 SEC — LOW (ref 27.5–35.5)
APTT BLD: 27.9 SEC — SIGNIFICANT CHANGE UP (ref 27.5–35.5)
AST SERPL-CCNC: 10 U/L — SIGNIFICANT CHANGE UP (ref 10–40)
AST SERPL-CCNC: 11 U/L — SIGNIFICANT CHANGE UP (ref 10–40)
AST SERPL-CCNC: 11 U/L — SIGNIFICANT CHANGE UP (ref 10–40)
AST SERPL-CCNC: 8 U/L — LOW (ref 10–40)
BACTERIA # UR AUTO: NEGATIVE — SIGNIFICANT CHANGE UP
BASOPHILS # BLD AUTO: 0.01 K/UL — SIGNIFICANT CHANGE UP (ref 0–0.2)
BASOPHILS # BLD AUTO: 0.01 K/UL — SIGNIFICANT CHANGE UP (ref 0–0.2)
BASOPHILS # BLD AUTO: 0.02 K/UL — SIGNIFICANT CHANGE UP (ref 0–0.2)
BASOPHILS NFR BLD AUTO: 0.1 % — SIGNIFICANT CHANGE UP (ref 0–2)
BILIRUB DIRECT SERPL-MCNC: 0.3 MG/DL — SIGNIFICANT CHANGE UP (ref 0–0.3)
BILIRUB DIRECT SERPL-MCNC: 0.3 MG/DL — SIGNIFICANT CHANGE UP (ref 0–0.3)
BILIRUB DIRECT SERPL-MCNC: 0.4 MG/DL — HIGH (ref 0–0.3)
BILIRUB DIRECT SERPL-MCNC: 0.4 MG/DL — HIGH (ref 0–0.3)
BILIRUB INDIRECT FLD-MCNC: 0.2 MG/DL — SIGNIFICANT CHANGE UP (ref 0.2–1)
BILIRUB INDIRECT FLD-MCNC: 0.2 MG/DL — SIGNIFICANT CHANGE UP (ref 0.2–1)
BILIRUB INDIRECT FLD-MCNC: 0.3 MG/DL — SIGNIFICANT CHANGE UP (ref 0.2–1)
BILIRUB INDIRECT FLD-MCNC: 0.4 MG/DL — SIGNIFICANT CHANGE UP (ref 0.2–1)
BILIRUB SERPL-MCNC: 0.5 MG/DL — SIGNIFICANT CHANGE UP (ref 0.2–1.2)
BILIRUB SERPL-MCNC: 0.6 MG/DL — SIGNIFICANT CHANGE UP (ref 0.2–1.2)
BILIRUB SERPL-MCNC: 0.6 MG/DL — SIGNIFICANT CHANGE UP (ref 0.2–1.2)
BILIRUB SERPL-MCNC: 0.8 MG/DL — SIGNIFICANT CHANGE UP (ref 0.2–1.2)
BILIRUB UR-MCNC: NEGATIVE — SIGNIFICANT CHANGE UP
BUN SERPL-MCNC: 14 MG/DL — SIGNIFICANT CHANGE UP (ref 7–23)
BUN SERPL-MCNC: 14 MG/DL — SIGNIFICANT CHANGE UP (ref 7–23)
BUN SERPL-MCNC: 16 MG/DL — SIGNIFICANT CHANGE UP (ref 7–23)
BUN SERPL-MCNC: 16 MG/DL — SIGNIFICANT CHANGE UP (ref 7–23)
CALCIUM SERPL-MCNC: 8.7 MG/DL — SIGNIFICANT CHANGE UP (ref 8.4–10.5)
CALCIUM SERPL-MCNC: 8.8 MG/DL — SIGNIFICANT CHANGE UP (ref 8.4–10.5)
CALCIUM SERPL-MCNC: 8.9 MG/DL — SIGNIFICANT CHANGE UP (ref 8.4–10.5)
CALCIUM SERPL-MCNC: 8.9 MG/DL — SIGNIFICANT CHANGE UP (ref 8.4–10.5)
CHLORIDE SERPL-SCNC: 112 MMOL/L — HIGH (ref 96–108)
CHLORIDE SERPL-SCNC: 114 MMOL/L — HIGH (ref 96–108)
CHLORIDE SERPL-SCNC: 116 MMOL/L — HIGH (ref 96–108)
CHLORIDE SERPL-SCNC: 122 MMOL/L — HIGH (ref 96–108)
CO2 SERPL-SCNC: 22 MMOL/L — SIGNIFICANT CHANGE UP (ref 22–31)
CO2 SERPL-SCNC: 23 MMOL/L — SIGNIFICANT CHANGE UP (ref 22–31)
CO2 SERPL-SCNC: 23 MMOL/L — SIGNIFICANT CHANGE UP (ref 22–31)
CO2 SERPL-SCNC: 26 MMOL/L — SIGNIFICANT CHANGE UP (ref 22–31)
COLOR SPEC: YELLOW — SIGNIFICANT CHANGE UP
CREAT SERPL-MCNC: 0.85 MG/DL — SIGNIFICANT CHANGE UP (ref 0.5–1.3)
CREAT SERPL-MCNC: 0.87 MG/DL — SIGNIFICANT CHANGE UP (ref 0.5–1.3)
CREAT SERPL-MCNC: 0.89 MG/DL — SIGNIFICANT CHANGE UP (ref 0.5–1.3)
CREAT SERPL-MCNC: 0.99 MG/DL — SIGNIFICANT CHANGE UP (ref 0.5–1.3)
DIFF PNL FLD: ABNORMAL
EGFR: 102 ML/MIN/1.73M2 — SIGNIFICANT CHANGE UP
EGFR: 103 ML/MIN/1.73M2 — SIGNIFICANT CHANGE UP
EGFR: 104 ML/MIN/1.73M2 — SIGNIFICANT CHANGE UP
EGFR: 91 ML/MIN/1.73M2 — SIGNIFICANT CHANGE UP
EOSINOPHIL # BLD AUTO: 0 K/UL — SIGNIFICANT CHANGE UP (ref 0–0.5)
EOSINOPHIL NFR BLD AUTO: 0 % — SIGNIFICANT CHANGE UP (ref 0–6)
EPI CELLS # UR: 2 /HPF — SIGNIFICANT CHANGE UP
GAS PNL BLDA: SIGNIFICANT CHANGE UP
GLUCOSE BLDC GLUCOMTR-MCNC: 203 MG/DL — HIGH (ref 70–99)
GLUCOSE BLDC GLUCOMTR-MCNC: 216 MG/DL — HIGH (ref 70–99)
GLUCOSE BLDC GLUCOMTR-MCNC: 225 MG/DL — HIGH (ref 70–99)
GLUCOSE BLDC GLUCOMTR-MCNC: 235 MG/DL — HIGH (ref 70–99)
GLUCOSE SERPL-MCNC: 235 MG/DL — HIGH (ref 70–99)
GLUCOSE SERPL-MCNC: 235 MG/DL — HIGH (ref 70–99)
GLUCOSE SERPL-MCNC: 238 MG/DL — HIGH (ref 70–99)
GLUCOSE SERPL-MCNC: 245 MG/DL — HIGH (ref 70–99)
GLUCOSE UR QL: ABNORMAL
HCT VFR BLD CALC: 29.5 % — LOW (ref 39–50)
HCT VFR BLD CALC: 29.6 % — LOW (ref 39–50)
HCT VFR BLD CALC: 30.3 % — LOW (ref 39–50)
HCT VFR BLD CALC: 31.5 % — LOW (ref 39–50)
HCT VFR BLD CALC: 31.7 % — LOW (ref 39–50)
HGB BLD-MCNC: 10 G/DL — LOW (ref 13–17)
HGB BLD-MCNC: 10 G/DL — LOW (ref 13–17)
HGB BLD-MCNC: 10.3 G/DL — LOW (ref 13–17)
HGB BLD-MCNC: 10.4 G/DL — LOW (ref 13–17)
HGB BLD-MCNC: 9.9 G/DL — LOW (ref 13–17)
HYALINE CASTS # UR AUTO: ABNORMAL /LPF
IMM GRANULOCYTES NFR BLD AUTO: 1.1 % — SIGNIFICANT CHANGE UP (ref 0–1.5)
IMM GRANULOCYTES NFR BLD AUTO: 1.6 % — HIGH (ref 0–1.5)
IMM GRANULOCYTES NFR BLD AUTO: 2 % — HIGH (ref 0–1.5)
INR BLD: 1.26 RATIO — HIGH (ref 0.88–1.16)
INR BLD: 1.31 RATIO — HIGH (ref 0.88–1.16)
KETONES UR-MCNC: SIGNIFICANT CHANGE UP
LEUKOCYTE ESTERASE UR-ACNC: NEGATIVE — SIGNIFICANT CHANGE UP
LIDOCAIN IGE QN: 38 U/L — SIGNIFICANT CHANGE UP (ref 7–60)
LIDOCAIN IGE QN: 40 U/L — SIGNIFICANT CHANGE UP (ref 7–60)
LIDOCAIN IGE QN: 46 U/L — SIGNIFICANT CHANGE UP (ref 7–60)
LIDOCAIN IGE QN: 46 U/L — SIGNIFICANT CHANGE UP (ref 7–60)
LYMPHOCYTES # BLD AUTO: 0.36 K/UL — LOW (ref 1–3.3)
LYMPHOCYTES # BLD AUTO: 0.43 K/UL — LOW (ref 1–3.3)
LYMPHOCYTES # BLD AUTO: 0.57 K/UL — LOW (ref 1–3.3)
LYMPHOCYTES # BLD AUTO: 2.3 % — LOW (ref 13–44)
LYMPHOCYTES # BLD AUTO: 2.6 % — LOW (ref 13–44)
LYMPHOCYTES # BLD AUTO: 3.3 % — LOW (ref 13–44)
MAGNESIUM SERPL-MCNC: 2 MG/DL — SIGNIFICANT CHANGE UP (ref 1.6–2.6)
MAGNESIUM SERPL-MCNC: 2.2 MG/DL — SIGNIFICANT CHANGE UP (ref 1.6–2.6)
MCHC RBC-ENTMCNC: 31 PG — SIGNIFICANT CHANGE UP (ref 27–34)
MCHC RBC-ENTMCNC: 31.3 PG — SIGNIFICANT CHANGE UP (ref 27–34)
MCHC RBC-ENTMCNC: 31.3 PG — SIGNIFICANT CHANGE UP (ref 27–34)
MCHC RBC-ENTMCNC: 31.5 PG — SIGNIFICANT CHANGE UP (ref 27–34)
MCHC RBC-ENTMCNC: 31.7 PG — SIGNIFICANT CHANGE UP (ref 27–34)
MCHC RBC-ENTMCNC: 32.7 GM/DL — SIGNIFICANT CHANGE UP (ref 32–36)
MCHC RBC-ENTMCNC: 32.8 GM/DL — SIGNIFICANT CHANGE UP (ref 32–36)
MCHC RBC-ENTMCNC: 33 GM/DL — SIGNIFICANT CHANGE UP (ref 32–36)
MCHC RBC-ENTMCNC: 33.4 GM/DL — SIGNIFICANT CHANGE UP (ref 32–36)
MCHC RBC-ENTMCNC: 33.9 GM/DL — SIGNIFICANT CHANGE UP (ref 32–36)
MCV RBC AUTO: 93.7 FL — SIGNIFICANT CHANGE UP (ref 80–100)
MCV RBC AUTO: 93.8 FL — SIGNIFICANT CHANGE UP (ref 80–100)
MCV RBC AUTO: 94.3 FL — SIGNIFICANT CHANGE UP (ref 80–100)
MCV RBC AUTO: 95.5 FL — SIGNIFICANT CHANGE UP (ref 80–100)
MCV RBC AUTO: 95.7 FL — SIGNIFICANT CHANGE UP (ref 80–100)
MONOCYTES # BLD AUTO: 0.36 K/UL — SIGNIFICANT CHANGE UP (ref 0–0.9)
MONOCYTES # BLD AUTO: 0.67 K/UL — SIGNIFICANT CHANGE UP (ref 0–0.9)
MONOCYTES # BLD AUTO: 0.7 K/UL — SIGNIFICANT CHANGE UP (ref 0–0.9)
MONOCYTES NFR BLD AUTO: 2.3 % — SIGNIFICANT CHANGE UP (ref 2–14)
MONOCYTES NFR BLD AUTO: 4 % — SIGNIFICANT CHANGE UP (ref 2–14)
MONOCYTES NFR BLD AUTO: 4.1 % — SIGNIFICANT CHANGE UP (ref 2–14)
NEUTROPHILS # BLD AUTO: 14.81 K/UL — HIGH (ref 1.8–7.4)
NEUTROPHILS # BLD AUTO: 15.3 K/UL — HIGH (ref 1.8–7.4)
NEUTROPHILS # BLD AUTO: 15.59 K/UL — HIGH (ref 1.8–7.4)
NEUTROPHILS NFR BLD AUTO: 90.5 % — HIGH (ref 43–77)
NEUTROPHILS NFR BLD AUTO: 91.7 % — HIGH (ref 43–77)
NEUTROPHILS NFR BLD AUTO: 94.2 % — HIGH (ref 43–77)
NITRITE UR-MCNC: NEGATIVE — SIGNIFICANT CHANGE UP
NRBC # BLD: 0 /100 WBCS — SIGNIFICANT CHANGE UP (ref 0–0)
PH UR: 6.5 — SIGNIFICANT CHANGE UP (ref 5–8)
PHOSPHATE SERPL-MCNC: 2.9 MG/DL — SIGNIFICANT CHANGE UP (ref 2.5–4.5)
PHOSPHATE SERPL-MCNC: 2.9 MG/DL — SIGNIFICANT CHANGE UP (ref 2.5–4.5)
PHOSPHATE SERPL-MCNC: 3 MG/DL — SIGNIFICANT CHANGE UP (ref 2.5–4.5)
PHOSPHATE SERPL-MCNC: 3.2 MG/DL — SIGNIFICANT CHANGE UP (ref 2.5–4.5)
PLATELET # BLD AUTO: 130 K/UL — LOW (ref 150–400)
PLATELET # BLD AUTO: 134 K/UL — LOW (ref 150–400)
PLATELET # BLD AUTO: 135 K/UL — LOW (ref 150–400)
PLATELET # BLD AUTO: 143 K/UL — LOW (ref 150–400)
PLATELET # BLD AUTO: 144 K/UL — LOW (ref 150–400)
POTASSIUM SERPL-MCNC: 3.2 MMOL/L — LOW (ref 3.5–5.3)
POTASSIUM SERPL-MCNC: 3.4 MMOL/L — LOW (ref 3.5–5.3)
POTASSIUM SERPL-MCNC: 3.4 MMOL/L — LOW (ref 3.5–5.3)
POTASSIUM SERPL-MCNC: 3.6 MMOL/L — SIGNIFICANT CHANGE UP (ref 3.5–5.3)
POTASSIUM SERPL-SCNC: 3.2 MMOL/L — LOW (ref 3.5–5.3)
POTASSIUM SERPL-SCNC: 3.4 MMOL/L — LOW (ref 3.5–5.3)
POTASSIUM SERPL-SCNC: 3.4 MMOL/L — LOW (ref 3.5–5.3)
POTASSIUM SERPL-SCNC: 3.6 MMOL/L — SIGNIFICANT CHANGE UP (ref 3.5–5.3)
PROT SERPL-MCNC: 6.8 G/DL — SIGNIFICANT CHANGE UP (ref 6–8.3)
PROT SERPL-MCNC: 6.9 G/DL — SIGNIFICANT CHANGE UP (ref 6–8.3)
PROT SERPL-MCNC: 7 G/DL — SIGNIFICANT CHANGE UP (ref 6–8.3)
PROT SERPL-MCNC: 7.2 G/DL — SIGNIFICANT CHANGE UP (ref 6–8.3)
PROT UR-MCNC: ABNORMAL
PROTHROM AB SERPL-ACNC: 14.5 SEC — HIGH (ref 10.5–13.4)
PROTHROM AB SERPL-ACNC: 14.6 SEC — HIGH (ref 10.5–13.4)
PROTHROM AB SERPL-ACNC: 14.7 SEC — HIGH (ref 10.5–13.4)
PROTHROM AB SERPL-ACNC: 15.1 SEC — HIGH (ref 10.5–13.4)
RBC # BLD: 3.14 M/UL — LOW (ref 4.2–5.8)
RBC # BLD: 3.15 M/UL — LOW (ref 4.2–5.8)
RBC # BLD: 3.23 M/UL — LOW (ref 4.2–5.8)
RBC # BLD: 3.29 M/UL — LOW (ref 4.2–5.8)
RBC # BLD: 3.32 M/UL — LOW (ref 4.2–5.8)
RBC # FLD: 13.8 % — SIGNIFICANT CHANGE UP (ref 10.3–14.5)
RBC # FLD: 13.8 % — SIGNIFICANT CHANGE UP (ref 10.3–14.5)
RBC # FLD: 13.9 % — SIGNIFICANT CHANGE UP (ref 10.3–14.5)
RBC # FLD: 13.9 % — SIGNIFICANT CHANGE UP (ref 10.3–14.5)
RBC # FLD: 14 % — SIGNIFICANT CHANGE UP (ref 10.3–14.5)
RBC CASTS # UR COMP ASSIST: 3 /HPF — SIGNIFICANT CHANGE UP (ref 0–4)
SODIUM SERPL-SCNC: 150 MMOL/L — HIGH (ref 135–145)
SODIUM SERPL-SCNC: 151 MMOL/L — HIGH (ref 135–145)
SODIUM SERPL-SCNC: 152 MMOL/L — HIGH (ref 135–145)
SODIUM SERPL-SCNC: 157 MMOL/L — HIGH (ref 135–145)
SP GR SPEC: 1.03 — HIGH (ref 1.01–1.02)
TROPONIN T, HIGH SENSITIVITY RESULT: 6 NG/L — SIGNIFICANT CHANGE UP (ref 0–51)
TROPONIN T, HIGH SENSITIVITY RESULT: 7 NG/L — SIGNIFICANT CHANGE UP (ref 0–51)
TROPONIN T, HIGH SENSITIVITY RESULT: <6 NG/L — SIGNIFICANT CHANGE UP (ref 0–51)
TROPONIN T, HIGH SENSITIVITY RESULT: <6 NG/L — SIGNIFICANT CHANGE UP (ref 0–51)
UROBILINOGEN FLD QL: NEGATIVE — SIGNIFICANT CHANGE UP
WBC # BLD: 15.72 K/UL — HIGH (ref 3.8–10.5)
WBC # BLD: 16.3 K/UL — HIGH (ref 3.8–10.5)
WBC # BLD: 16.38 K/UL — HIGH (ref 3.8–10.5)
WBC # BLD: 16.69 K/UL — HIGH (ref 3.8–10.5)
WBC # BLD: 17.21 K/UL — HIGH (ref 3.8–10.5)
WBC # FLD AUTO: 15.72 K/UL — HIGH (ref 3.8–10.5)
WBC # FLD AUTO: 16.3 K/UL — HIGH (ref 3.8–10.5)
WBC # FLD AUTO: 16.38 K/UL — HIGH (ref 3.8–10.5)
WBC # FLD AUTO: 16.69 K/UL — HIGH (ref 3.8–10.5)
WBC # FLD AUTO: 17.21 K/UL — HIGH (ref 3.8–10.5)
WBC UR QL: 2 /HPF — SIGNIFICANT CHANGE UP (ref 0–5)

## 2022-08-16 PROCEDURE — 71045 X-RAY EXAM CHEST 1 VIEW: CPT | Mod: 26

## 2022-08-16 PROCEDURE — 93308 TTE F-UP OR LMTD: CPT | Mod: 26

## 2022-08-16 PROCEDURE — 93321 DOPPLER ECHO F-UP/LMTD STD: CPT | Mod: 26

## 2022-08-16 PROCEDURE — 93010 ELECTROCARDIOGRAM REPORT: CPT

## 2022-08-16 PROCEDURE — 71045 X-RAY EXAM CHEST 1 VIEW: CPT | Mod: 26,77

## 2022-08-16 RX ORDER — FLUCONAZOLE 150 MG/1
400 TABLET ORAL ONCE
Refills: 0 | Status: COMPLETED | OUTPATIENT
Start: 2022-08-16 | End: 2022-08-16

## 2022-08-16 RX ORDER — FUROSEMIDE 40 MG
40 TABLET ORAL ONCE
Refills: 0 | Status: COMPLETED | OUTPATIENT
Start: 2022-08-16 | End: 2022-08-16

## 2022-08-16 RX ORDER — CHLORHEXIDINE GLUCONATE 213 G/1000ML
15 SOLUTION TOPICAL EVERY 12 HOURS
Refills: 0 | Status: DISCONTINUED | OUTPATIENT
Start: 2022-08-16 | End: 2022-08-17

## 2022-08-16 RX ORDER — FLUCONAZOLE 150 MG/1
TABLET ORAL
Refills: 0 | Status: DISCONTINUED | OUTPATIENT
Start: 2022-08-16 | End: 2022-08-17

## 2022-08-16 RX ORDER — POTASSIUM CHLORIDE 20 MEQ
10 PACKET (EA) ORAL
Refills: 0 | Status: COMPLETED | OUTPATIENT
Start: 2022-08-16 | End: 2022-08-16

## 2022-08-16 RX ORDER — FLUCONAZOLE 150 MG/1
400 TABLET ORAL EVERY 24 HOURS
Refills: 0 | Status: DISCONTINUED | OUTPATIENT
Start: 2022-08-17 | End: 2022-08-17

## 2022-08-16 RX ADMIN — Medication 1 DROP(S): at 11:27

## 2022-08-16 RX ADMIN — SODIUM CHLORIDE 50 MILLILITER(S): 9 INJECTION, SOLUTION INTRAVENOUS at 11:36

## 2022-08-16 RX ADMIN — PIPERACILLIN AND TAZOBACTAM 25 GRAM(S): 4; .5 INJECTION, POWDER, LYOPHILIZED, FOR SOLUTION INTRAVENOUS at 00:24

## 2022-08-16 RX ADMIN — Medication 50 MILLIEQUIVALENT(S): at 11:29

## 2022-08-16 RX ADMIN — Medication 25 MICROGRAM(S)/HR: at 11:35

## 2022-08-16 RX ADMIN — Medication 100 MILLIEQUIVALENT(S): at 03:34

## 2022-08-16 RX ADMIN — NICARDIPINE HYDROCHLORIDE 25 MG/HR: 30 CAPSULE, EXTENDED RELEASE ORAL at 17:03

## 2022-08-16 RX ADMIN — FLUCONAZOLE 100 MILLIGRAM(S): 150 TABLET ORAL at 18:46

## 2022-08-16 RX ADMIN — CHLORHEXIDINE GLUCONATE 1 APPLICATION(S): 213 SOLUTION TOPICAL at 05:06

## 2022-08-16 RX ADMIN — Medication 100 MILLIEQUIVALENT(S): at 04:49

## 2022-08-16 RX ADMIN — PANTOPRAZOLE SODIUM 40 MILLIGRAM(S): 20 TABLET, DELAYED RELEASE ORAL at 11:26

## 2022-08-16 RX ADMIN — Medication 4: at 05:48

## 2022-08-16 RX ADMIN — SODIUM CHLORIDE 50 MILLILITER(S): 9 INJECTION, SOLUTION INTRAVENOUS at 00:25

## 2022-08-16 RX ADMIN — Medication 40 MILLIGRAM(S): at 00:44

## 2022-08-16 RX ADMIN — Medication 50 MILLIEQUIVALENT(S): at 11:38

## 2022-08-16 RX ADMIN — PIPERACILLIN AND TAZOBACTAM 25 GRAM(S): 4; .5 INJECTION, POWDER, LYOPHILIZED, FOR SOLUTION INTRAVENOUS at 11:27

## 2022-08-16 RX ADMIN — Medication 4: at 00:33

## 2022-08-16 RX ADMIN — VASOPRESSIN 1.2 UNIT(S)/MIN: 20 INJECTION INTRAVENOUS at 11:35

## 2022-08-16 RX ADMIN — PIPERACILLIN AND TAZOBACTAM 25 GRAM(S): 4; .5 INJECTION, POWDER, LYOPHILIZED, FOR SOLUTION INTRAVENOUS at 16:46

## 2022-08-16 RX ADMIN — Medication 40 MILLIGRAM(S): at 12:12

## 2022-08-16 RX ADMIN — Medication 58 MILLIGRAM(S): at 12:16

## 2022-08-16 RX ADMIN — Medication 50 MILLIEQUIVALENT(S): at 13:12

## 2022-08-16 RX ADMIN — Medication 250 MILLIGRAM(S): at 17:04

## 2022-08-16 RX ADMIN — NICARDIPINE HYDROCHLORIDE 25 MG/HR: 30 CAPSULE, EXTENDED RELEASE ORAL at 00:21

## 2022-08-16 RX ADMIN — Medication 250 MILLIGRAM(S): at 05:48

## 2022-08-16 RX ADMIN — CHLORHEXIDINE GLUCONATE 15 MILLILITER(S): 213 SOLUTION TOPICAL at 05:06

## 2022-08-16 RX ADMIN — Medication 250 MILLIGRAM(S): at 00:45

## 2022-08-16 RX ADMIN — Medication 100 MILLIEQUIVALENT(S): at 02:29

## 2022-08-16 RX ADMIN — Medication 4: at 17:12

## 2022-08-16 RX ADMIN — CHLORHEXIDINE GLUCONATE 15 MILLILITER(S): 213 SOLUTION TOPICAL at 17:03

## 2022-08-16 RX ADMIN — Medication 1 DROP(S): at 00:25

## 2022-08-16 RX ADMIN — PIPERACILLIN AND TAZOBACTAM 25 GRAM(S): 4; .5 INJECTION, POWDER, LYOPHILIZED, FOR SOLUTION INTRAVENOUS at 05:02

## 2022-08-16 RX ADMIN — Medication 4: at 11:34

## 2022-08-16 RX ADMIN — Medication 1 DROP(S): at 05:06

## 2022-08-16 RX ADMIN — Medication 1 DROP(S): at 17:06

## 2022-08-16 NOTE — PROGRESS NOTE ADULT - THIS PATIENT HAS THE FOLLOWING CONDITION(S)/DIAGNOSES ON THIS ADMISSION:
Acute Respiratory Failure
Acute Respiratory Failure
Encephalopathy/Cerebral Edema/Brain Compression / Herniation/Acute Respiratory Failure
None
Cerebral Edema
None
None
Acute Respiratory Failure
None

## 2022-08-16 NOTE — PROGRESS NOTE ADULT - ASSESSMENT
A/P:  basilar occlusion s/p thrombectomy TICI 2c   was found down, upon arrival has a PaCo2> 100     Neuro: neuro checks q 1 hr, CTH w marked edema w effacement of 4th vent and cisterns; nonsurgical candidate   brain death examination  cangrelor  aspirin   Respiratory: intubated, ABG  CV: NSR, vic, vaso (DI)  Endocrine: finger sticks q6hrs, ISS , keep sugar 120-180 mmhg monitor DI  Heme/Onc: INR <1.5, Plt>100            DVT ppx: SCD, contraindicated to start anticoagulant as she is PBD0   Renal:  ml/hr; BMPq6h  ID: afebrile  GI: NPO, protonix, NG, colace                         
-S/p cerebral angiogram for basilar thrombectomy and stent placement  -S/p brain herniation  -GOC, Palliative consult pending
52M s/p mid basilar thrombectomy and stenting for basilar stroke 2/2 likely cocaine use, intubated on admission for unresponsiveness.   - brain death exam completed  - pending ethics vs palliative for GOC if can find family  - no further nsgy intervention  - care per NSCU/live on
 51 yo M transferred for unresponsiveness, arrives intubated with suspected cerebellar infarct found on CT head. Pt send for neuro IR.  Pt. was found with girlfriend who appeared intoxicated.  She admits to pt. using drugs.  No response to Narcan on scene.  Pt. found to be febrile in ER.  Intubated for airway protection  s/p thrombectomy of basilar artery TICI 2c  Deemed brain dead and is awaiting organ harvesting including heart.

## 2022-08-16 NOTE — CONSULT NOTE ADULT - ASSESSMENT
52 M p/w CVA, transferred from [place], found to have possible clot on CT head for which he was transferred for, underwent catheter cerebral angiogram and stroke intervention however was determined to be clinically brain dead by NSICU team. Pulmonology was consulted for bronchoscopy for lung donation workup.    Bronchoscopy performed  Sent for gram stain, bacterial culture, fungal culture, KOH  Additional BAL sent with live on for covid specific testing  Refer to bronch note for details
52 year old male s/p acute infarct of brain stem with resultant brain death and considered for organ transplant; Urology called to evaluate for risk of prostate cancer for remote history of prostate cancer    -imaging and labs reviewed  -low risk of prostate cancer - 0.01  -case d/w Dr Mcgraw
 51 yo M transferred for unresponsiveness, arrives intubated with suspected cerebellar infarct found on CT head. Pt send for neuro IR.  Pt. was found with girlfriend who appeared intoxicated.  She admits to pt. using drugs.  No response to Narcan on scene.  Pt. found to be febrile in ER.  Intubated for airway protection  s/p thrombectomy of basilar artery TICI 2c  Deemed brain dead and is awaiting organ harvesting including heart.

## 2022-08-16 NOTE — CONSULT NOTE ADULT - SUBJECTIVE AND OBJECTIVE BOX
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  Urology was consulted for the organ transplant team and neurosurgery for an evaluation of the risk of current prostate cancer.  Per organ transplant, the girlfriend mentioned that the pt had told her that he had prostate cancer in 2019 that was treated and cured.  Unknown modality of treatment.  He is being considered for a possible lung transplant.      PAST MEDICAL HISTORY    No pertinent past medical history        PAST SURGICAL HISTORY    No significant past surgical history        FAMILY HISTORY    unclear        HOME MEDICATIONS    unknown    DRUG ALLERGIES    NKDA    REVIEW OF SYSTEMS: Pertinent positives and negatives as stated in HPI, otherwise negative      VITAL SIGNS    T(F): 98.1, Max: 99.7 (22 @ 03:00)  HR: 86  BP: 153/90  RR: 18  SpO2: 99%    I's & O's      15 Aug 2022 07:  -  16 Aug 2022 07:00  --------------------------------------------------------  IN: 3819.9 mL / OUT: 3580 mL / NET: 239.9 mL    16 Aug 2022 07:  -  16 Aug 2022 10:32  --------------------------------------------------------  IN: 242.3 mL / OUT: 195 mL / NET: 47.3 mL        PHYSICAL EXAM    Gen: sedated, intubated  Abd: Soft, ND  GAYATRI: prostate is palpated to be approx 20gms - smooth, no nodules        LABS:                        10.4   16.69 )-----------( 143               31.7     152  |  116  |  14  ----------------------------<  235  3.4   |  23  |  0.99    Ca    8.7  Phos  3.2  Mg     2.0    TPro  7.0  /  Alb  3.3  /  TBili  0.6  /  DBili  0.3  /  AST  11  /  ALT  9   /  AlkPhos  74    PT: 14.6 sec;   INR: 1.26 ratio  PTT:27.4 sec    PSA - 0.19      Urinalysis Basic:    Color: Yellow / Appearance: Clear / S.026 / pH: x  Gluc: x / Ketone: Trace  / Bili: Negative / Urobili: Negative   Blood: x / Protein: 30 mg/dL / Nitrite: Negative   Leuk Esterase: Negative / RBC: 1 /hpf / WBC 1 /HPF   Sq Epi: x / Non Sq Epi: 1 /hpf / Bacteria: Negative        IMAGING:    CT:     LUNGS AND LARGE AIRWAYS: Patent central airways. No pulmonary nodules. Bibasilar dependent atelectasis.   MEDIASTINUM AND JANAE: No lymphadenopathy.  BLADDER: Collapsed around a Montgomery catheter balloon.  REPRODUCTIVE ORGANS: Prostate within normal limits.  RETROPERITONEUM/LYMPH NODES: No lymphadenopathy. 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  Urology was consulted by the organ transplant team and neurosurgery for an evaluation of the risk of current prostate cancer.  Per organ transplant, the girlfriend mentioned that the pt had told her that he had prostate cancer in 2019 that was treated and cured.  Unknown modality of treatment.  He is being considered for a possible lung transplant.      PAST MEDICAL HISTORY    No pertinent past medical history        PAST SURGICAL HISTORY    No significant past surgical history        FAMILY HISTORY    unclear        HOME MEDICATIONS    unknown    DRUG ALLERGIES    NKDA    REVIEW OF SYSTEMS: Pertinent positives and negatives as stated in HPI, otherwise negative      VITAL SIGNS    T(F): 98.1, Max: 99.7 (22 @ 03:00)  HR: 86  BP: 153/90  RR: 18  SpO2: 99%    I's & O's      15 Aug 2022 07:  -  16 Aug 2022 07:00  --------------------------------------------------------  IN: 3819.9 mL / OUT: 3580 mL / NET: 239.9 mL    16 Aug 2022 07:  -  16 Aug 2022 10:32  --------------------------------------------------------  IN: 242.3 mL / OUT: 195 mL / NET: 47.3 mL        PHYSICAL EXAM    Gen: sedated, intubated  Abd: Soft, ND  GAYATRI: prostate is palpated to be approx 20gms - smooth, no nodules        LABS:                        10.4   16.69 )-----------( 143               31.7     152  |  116  |  14  ----------------------------<  235  3.4   |  23  |  0.99    Ca    8.7  Phos  3.2  Mg     2.0    TPro  7.0  /  Alb  3.3  /  TBili  0.6  /  DBili  0.3  /  AST  11  /  ALT  9   /  AlkPhos  74    PT: 14.6 sec;   INR: 1.26 ratio  PTT:27.4 sec    PSA - 0.19      Urinalysis Basic:    Color: Yellow / Appearance: Clear / S.026 / pH: x  Gluc: x / Ketone: Trace  / Bili: Negative / Urobili: Negative   Blood: x / Protein: 30 mg/dL / Nitrite: Negative   Leuk Esterase: Negative / RBC: 1 /hpf / WBC 1 /HPF   Sq Epi: x / Non Sq Epi: 1 /hpf / Bacteria: Negative        IMAGING:    CT:     LUNGS AND LARGE AIRWAYS: Patent central airways. No pulmonary nodules. Bibasilar dependent atelectasis.   MEDIASTINUM AND JANAE: No lymphadenopathy.  BLADDER: Collapsed around a Montgomery catheter balloon.  REPRODUCTIVE ORGANS: Prostate within normal limits.  RETROPERITONEUM/LYMPH NODES: No lymphadenopathy.

## 2022-08-16 NOTE — PROGRESS NOTE ADULT - SUBJECTIVE AND OBJECTIVE BOX
Subjective: Patient seen and examined. No new events except as noted.   remains in ICU   s/p LHC Mild LAD     REVIEW OF SYSTEMS:  Unable to obtain     MEDICATIONS:  MEDICATIONS  (STANDING):  artificial tears (preservative free) Ophthalmic Solution 1 Drop(s) Both EYES every 6 hours  chlorhexidine 0.12% Liquid 15 milliLiter(s) Oral Mucosa every 12 hours  chlorhexidine 4% Liquid 1 Application(s) Topical <User Schedule>  dextrose 5%. 1000 milliLiter(s) (50 mL/Hr) IV Continuous <Continuous>  insulin lispro (ADMELOG) corrective regimen sliding scale   SubCutaneous every 6 hours  levothyroxine Infusion 10 MICROgram(s)/Hr (25 mL/Hr) IV Continuous <Continuous>  methylPREDNISolone sodium succinate IVPB 1000 milliGRAM(s) IV Intermittent every 24 hours  niCARdipine Infusion 5 mG/Hr (25 mL/Hr) IV Continuous <Continuous>  pantoprazole  Injectable 40 milliGRAM(s) IV Push daily  phenylephrine    Infusion 0.1 MICROgram(s)/kG/Min (3.75 mL/Hr) IV Continuous <Continuous>  piperacillin/tazobactam IVPB.. 3.375 Gram(s) IV Intermittent every 6 hours  potassium chloride  10 mEq/50 mL IVPB 10 milliEquivalent(s) IV Intermittent every 1 hour  vancomycin  IVPB 1000 milliGRAM(s) IV Intermittent every 12 hours  vasopressin Infusion 0.02 Unit(s)/Min (1.2 mL/Hr) IV Continuous <Continuous>      PHYSICAL EXAM:  T(C): 36.7 (22 @ 07:00), Max: 37.6 (22 @ 03:00)  HR: 87 (22 @ 09:00) (77 - 101)  BP: 153/90 (22 @ 07:45) (152/83 - 153/90)  RR: 18 (22 @ 09:00) (18 - 24)  SpO2: 99% (22 @ 09:00) (98% - 100%)  Wt(kg): --  I&O's Summary    15 Aug 2022 07:01  -  16 Aug 2022 07:00  --------------------------------------------------------  IN: 3819.9 mL / OUT: 3580 mL / NET: 239.9 mL    16 Aug 2022 07:01  -  16 Aug 2022 09:21  --------------------------------------------------------  IN: 154.8 mL / OUT: 150 mL / NET: 4.8 mL          Appearance: NAD Intubated sedated 	  HEENT:   dry  oral mucosa  Lymphatic: No lymphadenopathy +NGT   Cardiovascular: Normal S1 S2, No JVD, No murmurs, No edema  Respiratory: Ventilated   Psychiatry: A & O x 0  Gastrointestinal:  Soft, Non-tender, + BS	  Skin: No rashes, No ecchymoses, No cyanosis	  Extremities: decreased  range of motion, No clubbing, cyanosis or edema  Vascular: Peripheral pulses palpable 2+ bilaterally  Neurological: pupils 3 mm fixed, no corneal, no gag, no cough, 0/5 all over       LABS:    CARDIAC MARKERS:                                10.4   16.69 )-----------( 143      ( 16 Aug 2022 05:47 )             31.7     08-16    152<H>  |  116<H>  |  14  ----------------------------<  235<H>  3.4<L>   |  23  |  0.99    Ca    8.7      16 Aug 2022 05:47  Phos  3.2     -  Mg     2.0     -    TPro  7.0  /  Alb  3.3  /  TBili  0.6  /  DBili  0.3  /  AST  11  /  ALT  9<L>  /  AlkPhos  74  -          TELEMETRY: 	SR     ECG:  	  RADIOLOGY:   DIAGNOSTIC TESTING:  [ ] Echocardiogram:  [ ]  Catheterization:  < from: Cardiac Catheterization (08.15.22 @ 12:59) >      Study Date:     08/15/2022   Name:           LAKESHA GRACIA   :            1970   (52 years)   Gender:         male   MR#:            [id number]   [facility]#:           [id number]   Patient Class:  Inpatient     Cath Lab Report    Diagnostic Cardiologist:       Antionette Ford MD   Fellow:                        Araceli Perera MD   Referring Physician:           Daniel Sanderson DO     Procedures Performed   Procedures:               1.    Arterial Access - Right Femoral     2.    Diagnostic Coronary Angiography   3.    Perclose     Indications:               Transplant donor evaluation     Diagnostic Conclusions:     Diagnostic angiogram revealed moderate LAD disease.     Procedure Narrative:   The risks and alternatives of the procedures and conscious sedation  were explained to the patient and informed consent was  obtained. The patient was brought to the cath lab and placed on the  exam table.  Access   Right femoral artery:   The puncture site was infiltrated with 1% Lidocaine. Vascular access  was obtained using modified seldinger technique.    Diagnostic Findings:     Coronary Angiography   The coronary circulation is left dominant.      LM   Left main artery: Angiography shows minor irregularities.      LAD   Mid left anterior descending: There is a 40 % stenosis.      CX   Circumflex: Angiography shows minor irregularities.      RCA   Right coronary artery: Angiography shows minor irregularities.      History and Risk Factors:     Patient: LAKESHA GRACIA     MRN: [id number]  Study Date: 08/15/2022   12:59 PM      Page 1 of 3          Cerebrovascular Disease:                   Yes   Diabetes:                                  No   Cerebrovascular Disease Type                Stroke   Exam Start Time:   12:59 PM   Exam End Time:     01:28 PM   Exam Duration:     29 min     Contrast:   Description                    Dose         Unit       Serial No.   Omnipaque                         20.000   mL     Medication:   Description                 Dose, Unit, Route, Time     Inventory:   Description                 Quantity     [employer]#       Reference  No.    Serial No.     Lot No.       CDM  CATH PACK                 1.000        [id number]   [id number]  [id number]    030   HEPARIN SALINE 1000ML    1.000        [id number]   -52  [id number]    397   HEPARIN SALINE 1000ML     1.000        [id number]   -08  [id number]    397   .035 X 150 GUIDERIGHT     1.000        [id number]   [id number]  [id number]    594   OMNIPAQUE 350 150ML      1.000        [id number]   [id number]  [id number]    368   5FR SHEATH PINNACLE       1.000        [id number]   [id number]  [id number]    054   EXPO FL4                  1.000        [id number]   [id number]  [id number]    837   EXPO FR4        1.000        [id number]   [id number]  [id number]    838   6FR PERCLOSE PROGLIDE     1.000        [id number]   [id number]  [id number]    452   Hemodynamic Pressures:   Phase          Location          [mmHg]               [mmHg]  [mmHg]         HR  Baseline      Ao                  s      136               d      79  m      101         78    Conscious sedation was administered and monitored by Cardiology  nursing staff,  under my direct supervision when applicable.     Electronically signed by Antionette Ford MD on 08/15/2022 at 03:36 PM     Patient: LAKESHA GRACIA            MRN: [id number]  Study Date: 08/15/2022   12:59 PM      Page 2 of 3          Patient: LAKESHA GRACIA MRN: [id number] Study Date: 08/15/2022 12:59 PM  Page 3 of 3    < end of copied text >    [ ] Stress Test:    OTHER:

## 2022-08-16 NOTE — CONSULT NOTE ADULT - CONSULT REASON
determine current risk of prostate cancer
brain-death bronchoscopy
Cardiac Management   Cardiac Catheterization for heart harvest

## 2022-08-16 NOTE — PROGRESS NOTE ADULT - SUBJECTIVE AND OBJECTIVE BOX
O:  brain dead       Patient was declared brain dead 8/13/22 @ 15:05    T(C): 36.5 (08-14-22 @ 07:00), Max: 37.1 (08-13-22 @ 16:00)  HR: 86 (08-14-22 @ 11:00) (75 - 112)  BP: --  RR: 20 (08-14-22 @ 11:00) (0 - 20)  SpO2: 100% (08-14-22 @ 11:00) (97% - 100%)  08-13-22 @ 07:01  -  08-14-22 @ 07:00  --------------------------------------------------------  IN: 2612.9 mL / OUT: 1625 mL / NET: 987.9 mL    08-14-22 @ 07:01  -  08-14-22 @ 11:36  --------------------------------------------------------  IN: 334.6 mL / OUT: 100 mL / NET: 234.6 mL    chlorhexidine 0.12% Liquid 15 milliLiter(s) Oral Mucosa every 12 hours  chlorhexidine 4% Liquid 1 Application(s) Topical <User Schedule>  dextrose 5% + sodium chloride 0.45%. 1000 milliLiter(s) IV Continuous <Continuous>  hydrocortisone sodium succinate Injectable 50 milliGRAM(s) IV Push every 8 hours  norepinephrine Infusion 0.05 MICROgram(s)/kG/Min IV Continuous <Continuous>  pantoprazole  Injectable 40 milliGRAM(s) IV Push daily  phenylephrine    Infusion 0.1 MICROgram(s)/kG/Min IV Continuous <Continuous>  Mode: AC/ CMV (Assist Control/ Continuous Mandatory Ventilation), RR (machine): 20, TV (machine): 450, FiO2: 100, PEEP: 5, ITime: 1, MAP: 10, PIP: 20  PHYSICAL EXAM:    General: No Acute Distress     Neurological: pupils 3 mm fixed, no corneal, no gag, no cough, 0/5 all over     Pulmonary: Clear to Auscultation, No Rales, No Rhonchi, No Wheezes     Cardiovascular: S1, S2, Regular Rate and Rhythm     Gastrointestinal: Soft, Nontender, Nondistended     Extremities: No calf tenderness     Incision:         LABS:  Na: 153 (08-13 @ 13:21), 156 (08-13 @ 02:20), 155 (08-12 @ 21:28), 154 (08-12 @ 18:34), 151 (08-12 @ 11:03)  K: 3.8 (08-13 @ 13:21), 3.8 (08-13 @ 02:20), 4.1 (08-12 @ 21:28), 4.0 (08-12 @ 18:34), 3.9 (08-12 @ 11:03)  Cl: 120 (08-13 @ 13:21), 126 (08-13 @ 02:20), 126 (08-12 @ 21:28), 125 (08-12 @ 18:34), 121 (08-12 @ 11:03)  CO2: 22 (08-13 @ 13:21), 20 (08-13 @ 02:20), 18 (08-12 @ 21:28), 20 (08-12 @ 18:34), 20 (08-12 @ 11:03)  BUN: 14 (08-13 @ 13:21), 13 (08-13 @ 02:20), 13 (08-12 @ 21:28), 14 (08-12 @ 18:34), 15 (08-12 @ 11:03)  Cr: 0.98 (08-13 @ 13:21), 1.02 (08-13 @ 02:20), 1.12 (08-12 @ 21:28), 1.19 (08-12 @ 18:34), 1.12 (08-12 @ 11:03)  Glu: 151(08-13 @ 13:21), 129(08-13 @ 02:20), 112(08-12 @ 21:28), 98(08-12 @ 18:34), 67(08-12 @ 11:03)    Hgb: 13.2 (08-13 @ 02:20), 13.0 (08-12 @ 21:28), 13.6 (08-12 @ 18:34), 12.5 (08-12 @ 11:03)  Hct: 40.3 (08-13 @ 02:20), 40.2 (08-12 @ 21:28), 40.9 (08-12 @ 18:34), 37.9 (08-12 @ 11:03)  WBC: 13.83 (08-13 @ 02:20), 14.78 (08-12 @ 21:28), 14.13 (08-12 @ 18:34), 12.80 (08-12 @ 11:03)  Plt: 156 (08-13 @ 02:20), 158 (08-12 @ 21:28), 175 (08-12 @ 18:34), 158 (08-12 @ 11:03)    INR: 1.09 08-12-22 @ 12:57  PTT: 32.3 08-12-22 @ 12:57                A/P:  brain dead from stroke  Live on contacted patient;s father and plans for organ donation assesment  Per live On management:  Respiratory: intubated, Mode: AC/ CMV (Assist Control/ Continuous Mandatory Ventilation) bronch completed  CV: NSR, cath completed 8/15  vasopressin gtt, cardene gtt, MAP >65  monitor for DI   continue  solumedrol 1 grqd  Endocrine:  keep sugar 140-180 mmhg , levothyroxine iv gtt  Renal: D 5  1/2 NS 50 ml/hr   ID: keep temp > 36 degrees on zosyn and vanco  GI: NPO, protonix, NG    Will continue management w live on

## 2022-08-17 VITALS
OXYGEN SATURATION: 100 % | RESPIRATION RATE: 12 BRPM | HEART RATE: 79 BPM | SYSTOLIC BLOOD PRESSURE: 125 MMHG | TEMPERATURE: 97 F | WEIGHT: 175.27 LBS | HEIGHT: 67 IN | DIASTOLIC BLOOD PRESSURE: 77 MMHG

## 2022-08-17 LAB
ALBUMIN SERPL ELPH-MCNC: 3.1 G/DL — LOW (ref 3.3–5)
ALBUMIN SERPL ELPH-MCNC: 3.2 G/DL — LOW (ref 3.3–5)
ALP SERPL-CCNC: 82 U/L — SIGNIFICANT CHANGE UP (ref 40–120)
ALP SERPL-CCNC: 85 U/L — SIGNIFICANT CHANGE UP (ref 40–120)
ALT FLD-CCNC: 10 U/L — SIGNIFICANT CHANGE UP (ref 10–45)
ALT FLD-CCNC: 10 U/L — SIGNIFICANT CHANGE UP (ref 10–45)
AMYLASE P1 CFR SERPL: 132 U/L — HIGH (ref 25–125)
AMYLASE P1 CFR SERPL: 99 U/L — SIGNIFICANT CHANGE UP (ref 25–125)
ANION GAP SERPL CALC-SCNC: 13 MMOL/L — SIGNIFICANT CHANGE UP (ref 5–17)
ANION GAP SERPL CALC-SCNC: 14 MMOL/L — SIGNIFICANT CHANGE UP (ref 5–17)
APTT BLD: 27.7 SEC — SIGNIFICANT CHANGE UP (ref 27.5–35.5)
APTT BLD: 28.9 SEC — SIGNIFICANT CHANGE UP (ref 27.5–35.5)
AST SERPL-CCNC: 10 U/L — SIGNIFICANT CHANGE UP (ref 10–40)
AST SERPL-CCNC: 11 U/L — SIGNIFICANT CHANGE UP (ref 10–40)
BASOPHILS # BLD AUTO: 0.02 K/UL — SIGNIFICANT CHANGE UP (ref 0–0.2)
BASOPHILS NFR BLD AUTO: 0.1 % — SIGNIFICANT CHANGE UP (ref 0–2)
BILIRUB DIRECT SERPL-MCNC: 0.3 MG/DL — SIGNIFICANT CHANGE UP (ref 0–0.3)
BILIRUB DIRECT SERPL-MCNC: 0.3 MG/DL — SIGNIFICANT CHANGE UP (ref 0–0.3)
BILIRUB INDIRECT FLD-MCNC: 0.3 MG/DL — SIGNIFICANT CHANGE UP (ref 0.2–1)
BILIRUB INDIRECT FLD-MCNC: 0.3 MG/DL — SIGNIFICANT CHANGE UP (ref 0.2–1)
BILIRUB SERPL-MCNC: 0.6 MG/DL — SIGNIFICANT CHANGE UP (ref 0.2–1.2)
BILIRUB SERPL-MCNC: 0.6 MG/DL — SIGNIFICANT CHANGE UP (ref 0.2–1.2)
BUN SERPL-MCNC: 18 MG/DL — SIGNIFICANT CHANGE UP (ref 7–23)
BUN SERPL-MCNC: 20 MG/DL — SIGNIFICANT CHANGE UP (ref 7–23)
CALCIUM SERPL-MCNC: 9.1 MG/DL — SIGNIFICANT CHANGE UP (ref 8.4–10.5)
CALCIUM SERPL-MCNC: 9.2 MG/DL — SIGNIFICANT CHANGE UP (ref 8.4–10.5)
CHLORIDE SERPL-SCNC: 108 MMOL/L — SIGNIFICANT CHANGE UP (ref 96–108)
CHLORIDE SERPL-SCNC: 110 MMOL/L — HIGH (ref 96–108)
CO2 SERPL-SCNC: 24 MMOL/L — SIGNIFICANT CHANGE UP (ref 22–31)
CO2 SERPL-SCNC: 26 MMOL/L — SIGNIFICANT CHANGE UP (ref 22–31)
CREAT SERPL-MCNC: 0.78 MG/DL — SIGNIFICANT CHANGE UP (ref 0.5–1.3)
CREAT SERPL-MCNC: 0.85 MG/DL — SIGNIFICANT CHANGE UP (ref 0.5–1.3)
CULTURE RESULTS: SIGNIFICANT CHANGE UP
EGFR: 104 ML/MIN/1.73M2 — SIGNIFICANT CHANGE UP
EGFR: 107 ML/MIN/1.73M2 — SIGNIFICANT CHANGE UP
EOSINOPHIL # BLD AUTO: 0 K/UL — SIGNIFICANT CHANGE UP (ref 0–0.5)
EOSINOPHIL NFR BLD AUTO: 0 % — SIGNIFICANT CHANGE UP (ref 0–6)
FIBRINOGEN AG PPP IA-MCNC: 160 MG/DL — LOW (ref 233–496)
GAS PNL BLDA: SIGNIFICANT CHANGE UP
GLUCOSE SERPL-MCNC: 216 MG/DL — HIGH (ref 70–99)
GLUCOSE SERPL-MCNC: 245 MG/DL — HIGH (ref 70–99)
HCT VFR BLD CALC: 29.7 % — LOW (ref 39–50)
HGB BLD-MCNC: 9.9 G/DL — LOW (ref 13–17)
IMM GRANULOCYTES NFR BLD AUTO: 2.3 % — HIGH (ref 0–1.5)
INR BLD: 1.31 RATIO — HIGH (ref 0.88–1.16)
INR BLD: 1.31 RATIO — HIGH (ref 0.88–1.16)
LIDOCAIN IGE QN: 38 U/L — SIGNIFICANT CHANGE UP (ref 7–60)
LIDOCAIN IGE QN: 43 U/L — SIGNIFICANT CHANGE UP (ref 7–60)
LYMPHOCYTES # BLD AUTO: 0.52 K/UL — LOW (ref 1–3.3)
LYMPHOCYTES # BLD AUTO: 2.9 % — LOW (ref 13–44)
MAGNESIUM SERPL-MCNC: 2 MG/DL — SIGNIFICANT CHANGE UP (ref 1.6–2.6)
MAGNESIUM SERPL-MCNC: 2 MG/DL — SIGNIFICANT CHANGE UP (ref 1.6–2.6)
MCHC RBC-ENTMCNC: 31.2 PG — SIGNIFICANT CHANGE UP (ref 27–34)
MCHC RBC-ENTMCNC: 33.3 GM/DL — SIGNIFICANT CHANGE UP (ref 32–36)
MCV RBC AUTO: 93.7 FL — SIGNIFICANT CHANGE UP (ref 80–100)
MONOCYTES # BLD AUTO: 0.75 K/UL — SIGNIFICANT CHANGE UP (ref 0–0.9)
MONOCYTES NFR BLD AUTO: 4.2 % — SIGNIFICANT CHANGE UP (ref 2–14)
NEUTROPHILS # BLD AUTO: 16.09 K/UL — HIGH (ref 1.8–7.4)
NEUTROPHILS NFR BLD AUTO: 90.5 % — HIGH (ref 43–77)
NRBC # BLD: 0 /100 WBCS — SIGNIFICANT CHANGE UP (ref 0–0)
PHOSPHATE SERPL-MCNC: 2.7 MG/DL — SIGNIFICANT CHANGE UP (ref 2.5–4.5)
PHOSPHATE SERPL-MCNC: 5.2 MG/DL — HIGH (ref 2.5–4.5)
PLATELET # BLD AUTO: 147 K/UL — LOW (ref 150–400)
POTASSIUM SERPL-MCNC: 3.3 MMOL/L — LOW (ref 3.5–5.3)
POTASSIUM SERPL-MCNC: 3.5 MMOL/L — SIGNIFICANT CHANGE UP (ref 3.5–5.3)
POTASSIUM SERPL-SCNC: 3.3 MMOL/L — LOW (ref 3.5–5.3)
POTASSIUM SERPL-SCNC: 3.5 MMOL/L — SIGNIFICANT CHANGE UP (ref 3.5–5.3)
PROT SERPL-MCNC: 6.8 G/DL — SIGNIFICANT CHANGE UP (ref 6–8.3)
PROT SERPL-MCNC: 7.2 G/DL — SIGNIFICANT CHANGE UP (ref 6–8.3)
PROTHROM AB SERPL-ACNC: 15.1 SEC — HIGH (ref 10.5–13.4)
PROTHROM AB SERPL-ACNC: 15.1 SEC — HIGH (ref 10.5–13.4)
RBC # BLD: 3.17 M/UL — LOW (ref 4.2–5.8)
RBC # FLD: 13.4 % — SIGNIFICANT CHANGE UP (ref 10.3–14.5)
SODIUM SERPL-SCNC: 147 MMOL/L — HIGH (ref 135–145)
SODIUM SERPL-SCNC: 148 MMOL/L — HIGH (ref 135–145)
SPECIMEN SOURCE: SIGNIFICANT CHANGE UP
TROPONIN T, HIGH SENSITIVITY RESULT: <6 NG/L — SIGNIFICANT CHANGE UP (ref 0–51)
TROPONIN T, HIGH SENSITIVITY RESULT: <6 NG/L — SIGNIFICANT CHANGE UP (ref 0–51)
VANCOMYCIN TROUGH SERPL-MCNC: 14.9 UG/ML — SIGNIFICANT CHANGE UP (ref 10–20)
WBC # BLD: 17.79 K/UL — HIGH (ref 3.8–10.5)
WBC # FLD AUTO: 17.79 K/UL — HIGH (ref 3.8–10.5)

## 2022-08-17 PROCEDURE — 93005 ELECTROCARDIOGRAM TRACING: CPT

## 2022-08-17 PROCEDURE — 87102 FUNGUS ISOLATION CULTURE: CPT

## 2022-08-17 PROCEDURE — 93321 DOPPLER ECHO F-UP/LMTD STD: CPT

## 2022-08-17 PROCEDURE — G0103: CPT

## 2022-08-17 PROCEDURE — 81001 URINALYSIS AUTO W/SCOPE: CPT

## 2022-08-17 PROCEDURE — 94002 VENT MGMT INPAT INIT DAY: CPT

## 2022-08-17 PROCEDURE — 82150 ASSAY OF AMYLASE: CPT

## 2022-08-17 PROCEDURE — 87040 BLOOD CULTURE FOR BACTERIA: CPT

## 2022-08-17 PROCEDURE — 86901 BLOOD TYPING SEROLOGIC RH(D): CPT

## 2022-08-17 PROCEDURE — 82435 ASSAY OF BLOOD CHLORIDE: CPT

## 2022-08-17 PROCEDURE — 82330 ASSAY OF CALCIUM: CPT

## 2022-08-17 PROCEDURE — C1887: CPT

## 2022-08-17 PROCEDURE — 71045 X-RAY EXAM CHEST 1 VIEW: CPT

## 2022-08-17 PROCEDURE — 74176 CT ABD & PELVIS W/O CONTRAST: CPT

## 2022-08-17 PROCEDURE — 85027 COMPLETE CBC AUTOMATED: CPT

## 2022-08-17 PROCEDURE — 80202 ASSAY OF VANCOMYCIN: CPT

## 2022-08-17 PROCEDURE — 84100 ASSAY OF PHOSPHORUS: CPT

## 2022-08-17 PROCEDURE — 83036 HEMOGLOBIN GLYCOSYLATED A1C: CPT

## 2022-08-17 PROCEDURE — 93454 CORONARY ARTERY ANGIO S&I: CPT

## 2022-08-17 PROCEDURE — 80053 COMPREHEN METABOLIC PANEL: CPT

## 2022-08-17 PROCEDURE — 83930 ASSAY OF BLOOD OSMOLALITY: CPT

## 2022-08-17 PROCEDURE — 82803 BLOOD GASES ANY COMBINATION: CPT

## 2022-08-17 PROCEDURE — 81003 URINALYSIS AUTO W/O SCOPE: CPT

## 2022-08-17 PROCEDURE — 85018 HEMOGLOBIN: CPT

## 2022-08-17 PROCEDURE — 82962 GLUCOSE BLOOD TEST: CPT

## 2022-08-17 PROCEDURE — 87077 CULTURE AEROBIC IDENTIFY: CPT

## 2022-08-17 PROCEDURE — 80048 BASIC METABOLIC PNL TOTAL CA: CPT

## 2022-08-17 PROCEDURE — 83735 ASSAY OF MAGNESIUM: CPT

## 2022-08-17 PROCEDURE — 36415 COLL VENOUS BLD VENIPUNCTURE: CPT

## 2022-08-17 PROCEDURE — 82947 ASSAY GLUCOSE BLOOD QUANT: CPT

## 2022-08-17 PROCEDURE — 87186 SC STD MICRODIL/AGAR DIL: CPT

## 2022-08-17 PROCEDURE — 85014 HEMATOCRIT: CPT

## 2022-08-17 PROCEDURE — C9399: CPT

## 2022-08-17 PROCEDURE — 80307 DRUG TEST PRSMV CHEM ANLYZR: CPT

## 2022-08-17 PROCEDURE — 93970 EXTREMITY STUDY: CPT

## 2022-08-17 PROCEDURE — 71250 CT THORAX DX C-: CPT

## 2022-08-17 PROCEDURE — C1894: CPT

## 2022-08-17 PROCEDURE — 71045 X-RAY EXAM CHEST 1 VIEW: CPT | Mod: 26

## 2022-08-17 PROCEDURE — 87220 TISSUE EXAM FOR FUNGI: CPT

## 2022-08-17 PROCEDURE — 61645 PERQ ART M-THROMBECT &/NFS: CPT

## 2022-08-17 PROCEDURE — 86850 RBC ANTIBODY SCREEN: CPT

## 2022-08-17 PROCEDURE — 83605 ASSAY OF LACTIC ACID: CPT

## 2022-08-17 PROCEDURE — 82248 BILIRUBIN DIRECT: CPT

## 2022-08-17 PROCEDURE — 85385 FIBRINOGEN ANTIGEN: CPT

## 2022-08-17 PROCEDURE — C1769: CPT

## 2022-08-17 PROCEDURE — 84132 ASSAY OF SERUM POTASSIUM: CPT

## 2022-08-17 PROCEDURE — 83690 ASSAY OF LIPASE: CPT

## 2022-08-17 PROCEDURE — C1760: CPT

## 2022-08-17 PROCEDURE — 93306 TTE W/DOPPLER COMPLETE: CPT

## 2022-08-17 PROCEDURE — 82565 ASSAY OF CREATININE: CPT

## 2022-08-17 PROCEDURE — 94003 VENT MGMT INPAT SUBQ DAY: CPT

## 2022-08-17 PROCEDURE — 81005 URINALYSIS: CPT

## 2022-08-17 PROCEDURE — 31720 CLEARANCE OF AIRWAYS: CPT

## 2022-08-17 PROCEDURE — 85730 THROMBOPLASTIN TIME PARTIAL: CPT

## 2022-08-17 PROCEDURE — 76700 US EXAM ABDOM COMPLETE: CPT

## 2022-08-17 PROCEDURE — 99285 EMERGENCY DEPT VISIT HI MDM: CPT | Mod: 25

## 2022-08-17 PROCEDURE — 83935 ASSAY OF URINE OSMOLALITY: CPT

## 2022-08-17 PROCEDURE — 84484 ASSAY OF TROPONIN QUANT: CPT

## 2022-08-17 PROCEDURE — 87070 CULTURE OTHR SPECIMN AEROBIC: CPT

## 2022-08-17 PROCEDURE — 85610 PROTHROMBIN TIME: CPT

## 2022-08-17 PROCEDURE — 93308 TTE F-UP OR LMTD: CPT

## 2022-08-17 PROCEDURE — 84295 ASSAY OF SERUM SODIUM: CPT

## 2022-08-17 PROCEDURE — 70450 CT HEAD/BRAIN W/O DYE: CPT

## 2022-08-17 PROCEDURE — 86900 BLOOD TYPING SEROLOGIC ABO: CPT

## 2022-08-17 PROCEDURE — 85025 COMPLETE CBC W/AUTO DIFF WBC: CPT

## 2022-08-17 PROCEDURE — C9460: CPT

## 2022-08-17 RX ORDER — FUROSEMIDE 40 MG
40 TABLET ORAL ONCE
Refills: 0 | Status: COMPLETED | OUTPATIENT
Start: 2022-08-17 | End: 2022-08-17

## 2022-08-17 RX ORDER — POTASSIUM CHLORIDE 20 MEQ
40 PACKET (EA) ORAL ONCE
Refills: 0 | Status: COMPLETED | OUTPATIENT
Start: 2022-08-17 | End: 2022-08-17

## 2022-08-17 RX ORDER — POTASSIUM PHOSPHATE, MONOBASIC POTASSIUM PHOSPHATE, DIBASIC 236; 224 MG/ML; MG/ML
30 INJECTION, SOLUTION INTRAVENOUS ONCE
Refills: 0 | Status: COMPLETED | OUTPATIENT
Start: 2022-08-17 | End: 2022-08-17

## 2022-08-17 RX ORDER — POTASSIUM CHLORIDE 20 MEQ
20 PACKET (EA) ORAL
Refills: 0 | Status: COMPLETED | OUTPATIENT
Start: 2022-08-17 | End: 2022-08-17

## 2022-08-17 RX ADMIN — Medication 1 DROP(S): at 05:47

## 2022-08-17 RX ADMIN — PIPERACILLIN AND TAZOBACTAM 25 GRAM(S): 4; .5 INJECTION, POWDER, LYOPHILIZED, FOR SOLUTION INTRAVENOUS at 05:47

## 2022-08-17 RX ADMIN — Medication 40 MILLIGRAM(S): at 06:51

## 2022-08-17 RX ADMIN — VASOPRESSIN 1.2 UNIT(S)/MIN: 20 INJECTION INTRAVENOUS at 07:17

## 2022-08-17 RX ADMIN — Medication 100 MILLIEQUIVALENT(S): at 07:16

## 2022-08-17 RX ADMIN — CHLORHEXIDINE GLUCONATE 15 MILLILITER(S): 213 SOLUTION TOPICAL at 05:42

## 2022-08-17 RX ADMIN — NICARDIPINE HYDROCHLORIDE 25 MG/HR: 30 CAPSULE, EXTENDED RELEASE ORAL at 07:17

## 2022-08-17 RX ADMIN — Medication 4: at 06:39

## 2022-08-17 RX ADMIN — Medication 250 MILLIGRAM(S): at 05:42

## 2022-08-17 RX ADMIN — NICARDIPINE HYDROCHLORIDE 25 MG/HR: 30 CAPSULE, EXTENDED RELEASE ORAL at 05:42

## 2022-08-17 RX ADMIN — Medication 4: at 00:23

## 2022-08-17 RX ADMIN — Medication 1 DROP(S): at 01:20

## 2022-08-17 RX ADMIN — PIPERACILLIN AND TAZOBACTAM 25 GRAM(S): 4; .5 INJECTION, POWDER, LYOPHILIZED, FOR SOLUTION INTRAVENOUS at 00:23

## 2022-08-17 RX ADMIN — POTASSIUM PHOSPHATE, MONOBASIC POTASSIUM PHOSPHATE, DIBASIC 83.33 MILLIMOLE(S): 236; 224 INJECTION, SOLUTION INTRAVENOUS at 01:43

## 2022-08-17 RX ADMIN — Medication 40 MILLIGRAM(S): at 01:37

## 2022-08-17 RX ADMIN — Medication 100 MILLIEQUIVALENT(S): at 05:11

## 2022-08-18 LAB
FIBRINOGEN AG PPP IA-MCNC: 372 MG/DL — SIGNIFICANT CHANGE UP (ref 233–496)
FIBRINOGEN AG PPP IA-MCNC: 568 MG/DL — HIGH (ref 233–496)
FIBRINOGEN AG PPP IA-MCNC: 757 MG/DL — HIGH (ref 233–496)

## 2022-08-19 LAB
FIBRINOGEN AG PPP IA-MCNC: 515 MG/DL — HIGH (ref 233–496)
FIBRINOGEN AG PPP IA-MCNC: 827 MG/DL — HIGH (ref 233–496)

## 2022-08-25 LAB — FIBRINOGEN AG PPP IA-MCNC: 652 MG/DL — HIGH (ref 233–496)

## 2022-09-08 PROBLEM — Z00.00 ENCOUNTER FOR PREVENTIVE HEALTH EXAMINATION: Status: ACTIVE | Noted: 2022-09-08

## 2022-09-13 LAB — GLUCOSE BLDC GLUCOMTR-MCNC: 265 MG/DL — HIGH (ref 70–99)

## 2022-09-14 LAB
CULTURE RESULTS: SIGNIFICANT CHANGE UP
SPECIMEN SOURCE: SIGNIFICANT CHANGE UP

## 2022-09-15 LAB — CULTURE RESULTS: SIGNIFICANT CHANGE UP

## 2022-09-16 LAB — CULTURE RESULTS: SIGNIFICANT CHANGE UP

## 2022-09-17 LAB
-  AMPHOTERICIN B: SIGNIFICANT CHANGE UP
-  ANIDULAFUNGIN: SIGNIFICANT CHANGE UP
-  CASPOFUNGIN: SIGNIFICANT CHANGE UP
-  FLUCONAZOLE: SIGNIFICANT CHANGE UP
-  MICAFUNGIN: SIGNIFICANT CHANGE UP
-  POSACONAZOLE: SIGNIFICANT CHANGE UP
-  VORICONAZOLE: SIGNIFICANT CHANGE UP
CULTURE RESULTS: SIGNIFICANT CHANGE UP
METHOD TYPE: SIGNIFICANT CHANGE UP
ORGANISM # SPEC MICROSCOPIC CNT: SIGNIFICANT CHANGE UP
ORGANISM # SPEC MICROSCOPIC CNT: SIGNIFICANT CHANGE UP
